# Patient Record
Sex: MALE | Race: ASIAN | NOT HISPANIC OR LATINO | Employment: FULL TIME
[De-identification: names, ages, dates, MRNs, and addresses within clinical notes are randomized per-mention and may not be internally consistent; named-entity substitution may affect disease eponyms.]

---

## 2020-02-08 ENCOUNTER — HEALTH MAINTENANCE LETTER (OUTPATIENT)
Age: 30
End: 2020-02-08

## 2020-07-30 ENCOUNTER — OFFICE VISIT - HEALTHEAST (OUTPATIENT)
Dept: INTERNAL MEDICINE | Facility: CLINIC | Age: 30
End: 2020-07-30

## 2020-07-30 ENCOUNTER — COMMUNICATION - HEALTHEAST (OUTPATIENT)
Dept: TELEHEALTH | Facility: CLINIC | Age: 30
End: 2020-07-30

## 2020-07-30 DIAGNOSIS — E66.09 CLASS 2 OBESITY DUE TO EXCESS CALORIES WITHOUT SERIOUS COMORBIDITY WITH BODY MASS INDEX (BMI) OF 36.0 TO 36.9 IN ADULT: ICD-10-CM

## 2020-07-30 DIAGNOSIS — Z83.3 FAMILY HISTORY OF DIABETES MELLITUS: ICD-10-CM

## 2020-07-30 DIAGNOSIS — Z00.00 ROUTINE GENERAL MEDICAL EXAMINATION AT A HEALTH CARE FACILITY: ICD-10-CM

## 2020-07-30 DIAGNOSIS — E66.812 CLASS 2 OBESITY DUE TO EXCESS CALORIES WITHOUT SERIOUS COMORBIDITY WITH BODY MASS INDEX (BMI) OF 36.0 TO 36.9 IN ADULT: ICD-10-CM

## 2020-07-30 DIAGNOSIS — Z23 NEED FOR TD VACCINE: ICD-10-CM

## 2020-07-30 DIAGNOSIS — Z80.0 FAMILY HISTORY OF COLON CANCER: ICD-10-CM

## 2020-07-30 DIAGNOSIS — L70.8 OTHER ACNE: ICD-10-CM

## 2020-07-30 DIAGNOSIS — R06.83 SNORING: ICD-10-CM

## 2020-07-30 LAB
ALBUMIN SERPL-MCNC: 4.6 G/DL (ref 3.5–5)
ALP SERPL-CCNC: 34 U/L (ref 45–120)
ALT SERPL W P-5'-P-CCNC: 37 U/L (ref 0–45)
ANION GAP SERPL CALCULATED.3IONS-SCNC: 11 MMOL/L (ref 5–18)
AST SERPL W P-5'-P-CCNC: 25 U/L (ref 0–40)
BASOPHILS # BLD AUTO: 0.1 THOU/UL (ref 0–0.2)
BASOPHILS NFR BLD AUTO: 1 % (ref 0–2)
BILIRUB SERPL-MCNC: 0.7 MG/DL (ref 0–1)
BUN SERPL-MCNC: 14 MG/DL (ref 8–22)
CALCIUM SERPL-MCNC: 9.7 MG/DL (ref 8.5–10.5)
CHLORIDE BLD-SCNC: 104 MMOL/L (ref 98–107)
CHOLEST SERPL-MCNC: 183 MG/DL
CO2 SERPL-SCNC: 24 MMOL/L (ref 22–31)
CREAT SERPL-MCNC: 0.93 MG/DL (ref 0.7–1.3)
EOSINOPHIL # BLD AUTO: 0.3 THOU/UL (ref 0–0.4)
EOSINOPHIL NFR BLD AUTO: 3 % (ref 0–6)
ERYTHROCYTE [DISTWIDTH] IN BLOOD BY AUTOMATED COUNT: 11.9 % (ref 11–14.5)
FASTING STATUS PATIENT QL REPORTED: YES
GFR SERPL CREATININE-BSD FRML MDRD: >60 ML/MIN/1.73M2
GLUCOSE BLD-MCNC: 77 MG/DL (ref 70–125)
HBA1C MFR BLD: 5.6 % (ref 3.5–6)
HCT VFR BLD AUTO: 51.2 % (ref 40–54)
HDLC SERPL-MCNC: 67 MG/DL
HGB BLD-MCNC: 17.2 G/DL (ref 14–18)
LDLC SERPL CALC-MCNC: 96 MG/DL
LYMPHOCYTES # BLD AUTO: 2.3 THOU/UL (ref 0.8–4.4)
LYMPHOCYTES NFR BLD AUTO: 30 % (ref 20–40)
MCH RBC QN AUTO: 29.7 PG (ref 27–34)
MCHC RBC AUTO-ENTMCNC: 33.5 G/DL (ref 32–36)
MCV RBC AUTO: 88 FL (ref 80–100)
MONOCYTES # BLD AUTO: 0.6 THOU/UL (ref 0–0.9)
MONOCYTES NFR BLD AUTO: 7 % (ref 2–10)
NEUTROPHILS # BLD AUTO: 4.6 THOU/UL (ref 2–7.7)
NEUTROPHILS NFR BLD AUTO: 59 % (ref 50–70)
PLATELET # BLD AUTO: 312 THOU/UL (ref 140–440)
PMV BLD AUTO: 7.7 FL (ref 7–10)
POTASSIUM BLD-SCNC: 4.6 MMOL/L (ref 3.5–5)
PROT SERPL-MCNC: 7.7 G/DL (ref 6–8)
RBC # BLD AUTO: 5.79 MILL/UL (ref 4.4–6.2)
SODIUM SERPL-SCNC: 139 MMOL/L (ref 136–145)
TRIGL SERPL-MCNC: 98 MG/DL
TSH SERPL DL<=0.005 MIU/L-ACNC: 1.4 UIU/ML (ref 0.3–5)
URATE SERPL-MCNC: 7.3 MG/DL (ref 3–8)
WBC: 7.7 THOU/UL (ref 4–11)

## 2020-07-30 ASSESSMENT — MIFFLIN-ST. JEOR: SCORE: 1901.77

## 2020-07-31 ENCOUNTER — COMMUNICATION - HEALTHEAST (OUTPATIENT)
Dept: INTERNAL MEDICINE | Facility: CLINIC | Age: 30
End: 2020-07-31

## 2020-08-14 VITALS — HEIGHT: 61 IN | BODY MASS INDEX: 40.59 KG/M2 | WEIGHT: 215 LBS

## 2020-08-14 RX ORDER — DOXYCYCLINE 100 MG/1
100 CAPSULE ORAL
COMMUNITY
Start: 2020-07-30 | End: 2021-09-23

## 2020-08-14 ASSESSMENT — MIFFLIN-ST. JEOR: SCORE: 1785.67

## 2020-08-14 NOTE — PATIENT INSTRUCTIONS
"                            It was nice to see you in clinic today Jhonny,    Here are a few things that we talked about with regard to sleep habits:  1.  is excessive caffeine, judicious reduction towards the later part of the day is key, but this can be tricky if you are recovering from shiftwork.  It may be more reasonable,  if possible to take a 15-minute nap then to drink an entire cup of coffee.  Another habits that may improve your quality of sleep is to stop checking your phone throughout the night, or any time piece.  That means if he did not get up to the bathroom that all clocks be covered.    MY TREATMENT INFORMATION FOR SLEEP APNEA-  Jhonny Valera    DOCTOR : KODY [facility]  SLEEP CENTER :  [facility]    MY CONTACT NUMBER: [phone]    Am I having a home sleep study?  Watch this video:    Disposable device sent out require phone/computer application-   https://www.[employer].com/watch?v=BCce_vbiwxE  Please verify your insurance coverage with your insurance carrier    Frequently asked questions:  1. What is Obstructive Sleep Apnea (PAU)? PAU is the most common type of sleep apnea. Apnea means, \"without breath.\"  Apnea is most often caused by narrowing or collapse of the upper airway as muscles relax during sleep.   Almost everyone has occasional apneas. Most people with sleep apnea have had brief interruptions at night frequently for many years.  The severity of sleep apnea is related to how frequent and severe the events are.   2. What are the consequences of PAU? Symptoms include: feeling sleepy during the day, snoring loudly, gasping or stopping of breathing, trouble sleeping, and occasionally morning headaches or heartburn at night.  Sleepiness can be serious and even increase the risk of falling asleep while driving. Other health consequences may include development of high blood pressure and other cardiovascular disease in persons who are susceptible. Untreated PAU  can contribute " to heart disease, stroke and diabetes.   3. What are the treatment options? In most situations, sleep apnea is a lifelong disease that must be managed with daily therapy. Medications are not effective for sleep apnea and surgery is generally not considered until other therapies have been tried. Your treatment is your choice . Continuous Positive Airway (CPAP) works right away and is the therapy that is effective in nearly everyone. An oral device to hold your jaw forward is usually the next most reliable option. Other options include postioning devices (to keep you off your back), weight loss, and surgery including a tongue pacing device. There is more detail about some of these options below.    Important tips for using CPAP and similar devices   Know your equipment:  CPAP is continuous positive airway pressure that prevents obstructive sleep apnea by keeping the throat from collapsing while you are sleeping. In most cases, the device is  smart  and can slowly self-adjusts if your throat collapses and keeps a record every day of how well you are treated-this information is available to you and your care team.  BPAP is bilevel positive airway pressure that keeps your throat open and also assists each breath with a pressure boost to maintain adequate breathing.  Special kinds of BPAP are used in patients who have inadequate breathing from lung or heart disease. In most cases, the device is  smart  and can slowly self-adjusts to assist breathing. Like CPAP, the device keeps a record of how well you are treated.  Your mask is your connection to the device. You get to choose what feels most comfortable and the staff will help to make sure if fits. Here: are some examples of the different masks that are available:       Key points to remember on your journey with sleep apnea:  1. Sleep study.  PAP devices often need to be adjusted during a sleep study to show that they are effective and adjusted right.  2. Good tips to  remember: Try wearing just the mask during a quiet time during the day so your body adapts to wearing it. A humidifier is recommended for comfort in most cases to prevent drying of your nose and throat. Allergy medication from your provider may help you if you are having nasal congestion.  3. Getting settled-in. It takes more than one night for most of us to get used to wearing a mask. Try wearing just the mask during a quiet time during the day so your body adapts to wearing it. A humidifier is recommended for comfort in most cases. Our team will work with you carefully on the first day and will be in contact within 4 days and again at 2 and 4 weeks for advice and remote device adjustments. Your therapy is evaluated by the device each day.   4. Use it every night. The more you are able to sleep naturally for 7-8 hours, the more likely you will have good sleep and to prevent health risks or symptoms from sleep apnea. Even if you use it 4 hours it helps. Occasionally all of us are unable to use a medical therapy, in sleep apnea, it is not dangerous to miss one night.   5. Communicate. Call our skilled team on the number provided on the first day if your visit for problems that make it difficult to wear the device. Over 2 out of 3 patients can learn to wear the device long-term with help from our team. Remember to call our team or your sleep providers if you are unable to wear the device as we may have other solutions for those who cannot adapt to mask CPAP therapy. It is recommended that you sleep your sleep provider within the first 3 months and yearly after that if you are not having problems.   6. Use it for your health. We encourage use of CPAP masks during daytime quiet periods to allow your face and brain to adapt to the sensation of CPAP so that it will be a more natural sensation to awaken to at night or during naps. This can be very useful during the first few weeks or months of adapting to CPAP though it  does not help medically to wear CPAP during wakefulness and  should not be used as a strategy just to meet guidelines.  7. Take care of your equipment. Make sure you clean your mask and tubing using directions every day and that your filter and mask are replaced as recommended or if they are not working.     BESIDES CPAP, WHAT OTHER THERAPIES ARE THERE?    Positioning Device  Positioning devices are generally used when sleep apnea is mild and only occurs on your back.This example shows a pillow that straps around the waist. It may be appropriate for those whose sleep study shows milder sleep apnea that occurs primarily when lying flat on one's back. Preliminary studies have shown benefit but effectiveness at home may need to be verified by a home sleep test. These devices are generally not covered by medical insurance.  Examples of devices that maintain sleeping on the back to prevent snoring and mild sleep apnea.    Belt type body positioner  Http://[employer] ([employer])/    Electronic reminder  Http://nightshifttherapy.com/  Http://www.DoesThatMakeSense.com.Sapheneia.au/      Oral Appliance  What is oral appliance therapy?  An oral appliance device fits on your teeth at night like a retainer used after having braces. The device is made by a specialized dentist and requires several visits over 1-2 months before a manufactured device is made to fit your teeth and is adjusted to prevent your sleep apnea. Once an oral device is working properly, snoring should be improved. A home sleep test may be recommended at that time if to determine whether the sleep apnea is adequately treated.       Some things to remember:  -Oral devices are often, but not always, covered by your medical insurance. Be sure to check with your insurance provider.   -If you are referred for oral therapy, you will be given a list of specialized dentists to consider or you may choose to visit the Web site of the American Academy of Dental Sleep Medicine  -Oral devices are less  likely to work if you have severe sleep apnea or are extremely overweight.     More detailed information  An oral appliance is a small acrylic device that fits over the upper and lower teeth  (similar to a retainer or a mouth guard). This device slightly moves jaw forward, which moves the base of the tongue forward, opens the airway, improves breathing for effective treat snoring and obstructive sleep apnea in perhaps 7 out of 10 people .  The best working devices are custom-made by a dental device  after a mold is made of the teeth 1, 2, 3.  When is an oral appliance indicated?  Oral appliance therapy is recommended as a first-line treatment for patients with primary snoring, mild sleep apnea, and for patients with moderate sleep apnea who prefer appliance therapy to use of CPAP4, 5. Severity of sleep apnea is determined by sleep testing and is based on the number of respiratory events per hour of sleep.   How successful is oral appliance therapy?  The success rate of oral appliance therapy in patients with mild sleep apnea is 75-80% while in patients with moderate sleep apnea it is 50-70%. The chance of success in patients with severe sleep apnea is 40-50%. The research also shows that oral appliances have a beneficial effect on the cardiovascular health of PAU patients at the same magnitude as CPAP therapy7.  Oral appliances should be a second-line treatment in cases of severe sleep apnea, but if not completely successful then a combination therapy utilizing CPAP plus oral appliance therapy may be effective. Oral appliances tend to be effective in a broad range of patients although studies show that the patients who have the highest success are females, younger patients, those with milder disease, and less severe obesity. 3, 6.   Finding a dentist that practices dental sleep medicine  Specific training is available through the American Academy of Dental Sleep Medicine for dentists interested in  working in the field of sleep. To find a dentist who is educated in the field of sleep and the use of oral appliances, near you, visit the Web site of the American Academy of Dental Sleep Medicine.    References  1. Brigida et al. Objectively measured vs self-reported compliance during oral appliance therapy for sleep-disordered breathing. Chest 2013; 144(5): 2057-8779.  2. Lorri et al. Objective measurement of compliance during oral appliance therapy for sleep-disordered breathing. Thorax 2013; 68(1): 91-96.  3. Debby et al. Mandibular advancement devices in 620 men and women with PAU and snoring: tolerability and predictors of treatment success. Chest 2004; 125: 7858-9197.  4. Frederic et al. Oral appliances for snoring and PAU: a review. Sleep 2006; 29: 244-262.  5. Victoriano et al. Oral appliance treatment for PAU: an update. J Clin Sleep Med 2014; 10(2): 215-227.  6. Martha et al. Predictors of OSAH treatment outcome. J Dent Res 2007; 86: 8875-5085.      Weight Loss:    Weight loss is a long-term strategy that may improve sleep apnea in some patients.    Weight management is a personal decision and the decision should be based on your interest and the potential benefits.  If you are interested in exploring weight loss strategies, the following discussion covers the impact on weight loss on sleep apnea and the approaches that may be successful.    Being overweight does not necessarily mean you will have health consequences.  Those who have BMI over 35 or over 27 with existing medical conditions carries greater risk.   Weight loss decreases severity of sleep apnea in most people with obesity. For those with mild obesity who have developed snoring with weight gain, even 15-30 pound weight loss can improve and occasionally eliminate sleep apnea.  Structured and life-long dietary and health habits are necessary to lose weight and keep healthier weight levels.     Though there may be significant  health benefits from weight loss, long-term weight loss is very difficult to achieve- studies show success with dietary management in less than 10% of people. In addition, substantial weight loss may require years of dietary control and may be difficult if patients have severe obesity. In these cases, surgical management may be considered.  Finally, older individuals who have tolerated obesity without health complications may be less likely to benefit from weight loss strategies.      [unfilled]    Surgery:    Surgery for obstructive sleep apnea is considered generally only when other therapies fail to work. Surgery may be discussed with you if you are having a difficult time tolerating CPAP and or when there is an abnormal structure that requires surgical correction.  Nose and throat surgeries often enlarge the airway to prevent collapse.  Most of these surgeries create pain for 1-2 weeks and up to half of the most common surgeries are not effective throughout life.  You should carefully discuss the benefits and drawbacks to surgery with your sleep provider and surgeon to determine if it is the best solution for you.   More information  Surgery for PAU is directed at areas that are responsible for narrowing or complete obstruction of the airway during sleep.  There are a wide range of procedures available to enlarge and/or stabilize the airway to prevent blockage of breathing in the three major areas where it can occur: the palate, tongue, and nasal regions.  Successful surgical treatment depends on the accurate identification of the factors responsible for obstructive sleep apnea in each person.  A personalized approach is required because there is no single treatment that works well for everyone.  Because of anatomic variation, consultation with an examination by a sleep surgeon is a critical first step in determining what surgical options are best for each patient.  In some cases, examination during sedation may  be recommended in order to guide the selection of procedures.  Patients will be counseled about risks and benefits as well as the typical recovery course after surgery. Surgery is typically not a cure for a person s PAU.  However, surgery will often significantly improve one s PAU severity (termed  success rate ).  Even in the absence of a cure, surgery will decrease the cardiovascular risk associated with OSA7; improve overall quality of life8 (sleepiness, functionality, sleep quality, etc).      Palate Procedures:  Patients with PAU often have narrowing of their airway in the region of their tonsils and uvula.  The goals of palate procedures are to widen the airway in this region as well as to help the tissues resist collapse.  Modern palate procedure techniques focus on tissue conservation and soft tissue rearrangement, rather than tissue removal.  Often the uvula is preserved in this procedure. Residual sleep apnea is common in patient after pharyngoplasty with an average reduction in sleep apnea events of 33%2.      Tongue Procedures:  ExamWhile patients are awake, the muscles that surround the throat are active and keep this region open for breathing. These muscles relax during sleep, allowing the tongue and other structures to collapse and block breathing.  There are several different tongue procedures available.  Selection of a tongue base procedure depends on characteristics seen on physical exam.  Generally, procedures are aimed at removing bulky tissues in this area or preventing the back of the tongue from falling back during sleep.  Success rates for tongue surgery range from 50-62%3.    Hypoglossal Nerve Stimulation:  Hypoglossal nerve stimulation has recently received approval from the [place] Food and Drug Administration for the treatment of obstructive sleep apnea.  This is based on research showing that the system was safe and effective in treating sleep apnea6.  Results showed that the  median AHI score decreased 68%, from 29.3 to 9.0. This therapy uses an implant system that senses breathing patterns and delivers mild stimulation to airway muscles, which keeps the airway open during sleep.  The system consists of three fully implanted components: a small generator (similar in size to a pacemaker), a breathing sensor, and a stimulation lead.  Using a small handheld remote, a patient turns the therapy on before bed and off upon awakening.    Candidates for this device must be greater than 22 years of age, have moderate to severe PAU (AHI between 20-65), BMI less than 32, have tried CPAP/oral appliance without success, and have appropriate upper airway anatomy (determined by a sleep endoscopy performed by Dr. Coronado).    Hypoglossal Nerve Stimulation Pathway:    The sleep surgeon s office will work with the patient through the insurance prior-authorization process (including communications and appeals).    Nasal Procedures:  Nasal obstruction can interfere with nasal breathing during the day and night.  Studies have shown that relief of nasal obstruction can improve the ability of some patients to tolerate positive airway pressure therapy for obstructive sleep apnea1.  Treatment options include medications such as nasal saline, topical corticosteroid and antihistamine sprays, and oral medications such as antihistamines or decongestants. Non-surgical treatments can include external nasal dilators for selected patients. If these are not successful by themselves, surgery can improve the nasal airway either alone or in combination with these other options.      Combination Procedures:  Combination of surgical procedures and other treatments may be recommended, particularly if patients have more than one area of narrowing or persistent positional disease.  The success rate of combination surgery ranges from 66-80%2,3.    References  1. Pedrito ALEXANDER. The Role of the Nose in Snoring and Obstructive Sleep  Apnoea: An Update.  Eur Arch Otorhinolaryngol. 2011; 268: 1365-73.  2.  Gilberto SM; Charissa JA; Charity JR; Pallanch JF; Fransisco MB; William SG; Rafal NICHOLE. Surgical modifications of the upper airway for obstructive sleep apnea in adults: a systematic review and meta-analysis. SLEEP 2010;33(10):5673-3322. Sia MORGAN. Hypopharyngeal surgery in obstructive sleep apnea: an evidence-based medicine review.  Arch Otolaryngol Head Neck Surg. 2006 Feb;132(2):206-13.  3. Jason YH1, Lc Y, Jose Eduardo MIGUEL ANGEL. The efficacy of anatomically based multilevel surgery for obstructive sleep apnea. Otolaryngol Head Neck Surg. 2003 Oct;129(4):327-35.  4. Kezirian E, Goldberg A. Hypopharyngeal Surgery in Obstructive Sleep Apnea: An Evidence-Based Medicine Review. Arch Otolaryngol Head Neck Surg. 2006 Feb;132(2):206-13.  5. Vignesh CHOPRA et al. Upper-Airway Stimulation for Obstructive Sleep Apnea.  N Engl J Med. 2014 Jan 9;370(2):139-49.  6. Karen Y et al. Increased Incidence of Cardiovascular Disease in Middle-aged Men with Obstructive Sleep Apnea. Am J Respir Crit Care Med; 2002 166: 159-165  7. Christian EM et al. Studying Life Effects and Effectiveness of Palatopharyngoplasty (SLEEP) study: Subjective Outcomes of Isolated Uvulopalatopharyngoplasty. Otolaryngol Head Neck Surg. 2011; 144: 623-631.            Your BMI is Body mass index is 41.3 kg/m .  This is wrong Jhonny is 35.2 BMI  Weight management is a personal decision.  If you are interested in exploring weight loss strategies, the following discussion covers the approaches that may be successful. Body mass index (BMI) is one way to tell whether you are at a healthy weight, overweight, or obese. It measures your weight in relation to your height.  A BMI of 18.5 to 24.9 is in the healthy range. A person with a BMI of 25 to 29.9 considered overweight, and someone with a BMI of 30 or greater is considered obese. More than two-thirds of American adults are considered overweight or obese.  Being  overweight or obese increases the risk for further weight gain. Excess weight may lead to heart disease and diabetes.  Creating and following plans for healthy eating and physical activity may help you improve your health.  Weight control is part of healthy lifestyle and includes exercise, emotional health, and healthy eating habits. Careful eating habits lifelong are the mainstay of weight control. Though there are significant health benefits from weight loss, long-term weight loss with diet alone may be very difficult to achieve- studies show long-term success with dietary management in less than 10% of people. Attaining a healthy weight may be especially difficult to achieve in those with severe obesity. In some cases, medications, devices and surgical management might be considered.  What can you do?  If you are overweight or obese and are interested in methods for weight loss, you should discuss this with your provider.     Consider reducing daily calorie intake by 500 calories.     Keep a food journal.     Avoiding skipping meals, consider cutting portions instead.    Diet combined with exercise helps maintain muscle while optimizing fat loss. Strength training is particularly important for building and maintaining muscle mass. Exercise helps reduce stress, increase energy, and improves fitness. Increasing exercise without diet control, however, may not burn enough calories to loose weight.       Start walking three days a week 10-20 minutes at a time    Work towards walking thirty minutes five days a week     Eventually, increase the speed of your walking for 1-2 minutes at time    In addition, we recommend that you review healthy lifestyles and methods for weight loss available through the National Institutes of Health patient information sites:  http://win.niddk.nih.gov/publications/index.htm    And look into health and wellness programs that may be available through your health insurance provider,  employer, local community center, or noel club.    Weight management plan: Patient was referred to their PCP to discuss a diet and exercise plan.

## 2020-08-14 NOTE — PROGRESS NOTES
"Jhonny Valera is a 29 year old male who is being evaluated via a billable video visit.      The patient has been notified of following:     \"This video visit will be conducted via a call between you and your physician/provider. We have found that certain health care needs can be provided without the need for an in-person physical exam.  This service lets us provide the care you need with a video conversation.  If a prescription is necessary we can send it directly to your pharmacy.  If lab work is needed we can place an order for that and you can then stop by our lab to have the test done at a later time.    Video visits are billed at different rates depending on your insurance coverage.  Please reach out to your insurance provider with any questions.    If during the course of the call the physician/provider feels a video visit is not appropriate, you will not be charged for this service.\"    Patient has given verbal consent for Video visit? Yes  How would you like to obtain your AVS? MyChart  If you are dropped from the video visit, the video invite should be resent to: Send to e-mail at: [email or web address]  Will anyone else be joining your video visit? No      Video-Visit Details    Type of service:  Video Visit    Video Start Time: 2:09 PM  Video End Time: 2:58 pm    Originating Location (pt. Location): Home    Distant Location (provider location):  [facility]     Platform used for Video Visit: KDOY Tinajero CNP    Referral #  Creation Date  Referral Status  Status Update     [id number]  08/03/2020  Authorized  08/03/2020: Status History    Status Reason  Referral Type  Referral Reasons  Referral Class    No Approval Necessary - Patient Tracking  none  none  none    To Specialty  To Provider  To Location/Place of Service  To Department    Nurse Practitioner  Helen Kwok APRN CNP  [facility] " CENTER    To Vendor  Referred By  By Location/Place of Service  By Department    none  none  none  none    Priority  Start Date  Expiration Date  Referral Entered By    Routine  08/17/2020 08/17/2021  Barthel, Elizabeth J.    Visits Requested  Visits Authorized  Visits Completed  Visits Scheduled    1  1   1    Procedure Information     Free Text Procedure Description     NEW REFERRED INSIDE [facility]             Diagnosis Information     Diagnosis Description    HE referal from Oskar Damon for consult for snoring, no prev ss or machines please send pkt, may call [phone] for appt updates and may leave message    Referral Notes   Number of Notes: 1   Type  Date  User  Summary  Attachment    FSC  08/03/2020  1:13 PM  Barthel, Elizabeth J.  -  -    Note     PB DOS:                                 8/17/2020  Payor:                                     [employer] [place] [facility] [place]  Visit Description:                  SLEEP EVALUATION & MANAGEMENT REFERRAL - ADULT     Benefits Contact:                  SERENA-Verified  Benefits Phone:                      N/A  Reference Number:                N/A     Assigned Clinic:                      No assigned PCC  Referral Number:                    N/A  Referral Date Range:              N/A  Number of Visits:                    N/A     Procedures  Actigraphy:                              Non-Covered     Oximetry:                                 Non-Covered     HST/PSG:                               Auth Required  MSLT/MWT:                            Auth Required   WatchPat:                               Auth Required                   Communications   (0)        None      Sleep Center   Outpatient Sleep Medicine Consultation  8/17/2020        Name: Jhonny Valera MRN# [id number]   Age:  29 YOB: 1990      Date of Consultation: 08/17/2020   Consultation is requested by: Oskar Whitt MD - 07/30/2020 11:00 AM CDT    29-year-old man, 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  Obesity with body mass index of 36.7, with associated condition of snoring with concern for sleep apnea, but no history of metabolic disorders.          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  He is working hard to reduce his weight however he continues to have problems with daytime sleepiness at times and sleep quality could be better towards the end of the nights rest when it is fragmented with wake ups.  He has addressed shift rotation by use of melatonin which seems to work well when he switches from working PMs tonight.  He does use devices in bed briefly for mental distraction such as a podcast, however with wake ups throughout the night he may check the time which likely does and to some frustration about his inability to sleep throughout the night.  Caffeine intake is admittedly excessive by him.  Reduction was discussed  SCALES       SLEEP APNEA: Stopbang score: 6/8       INSOMNIA:  Insomnia severity score:  12/28       SLEEPINESS: [place] sleepiness scale: 8-10/24       RAÚL 7:      It is my impression that Jhonny, who is a 29-year-old male of  ethnicity, has a high pretest probability for obstructive sleep apnea with a stop bang score of 6/8, positive family history of sleep disordered breathing, and has a wide variety of symptoms and signs typical of obstructive sleep apnea, with classic impact on daytime alertness with feelings of tiredness and fatigue further exacerbated by shift rotation.  As his sleep  schedule is actually quite regular with exception towards the end of his nights rest he would likely be an excellent candidate for the watch pat 1.  He would like to have this completed while he is on a one-month schedule of dayshift which is next month.  Summary Sleep Diagnoses:      Snoring : With snort arousals, some feeling of breathing better at night if propped up on pillows, some feeling of panic when he awakens with an awareness of having not been breathing.  I think he would be an excellent candidate for the watch pat 1 with a regular bedtime, and minimal length of wake ups until the end of the nights rest.  I did describe the technology and he does not have raynaud's syndrome and is not on an alpha-blocker.  If he has significant obstructive sleep apnea he would like to be called at the time that his study is read so that he may be placed on CPAP therapy.  He was would also like a follow-up visit to discuss study results.    Shiftwork: Well handled in general with use of melatonin when changing from be a night to a day sleeper    Sleep habits: Somewhat excessive caffeine, should consider reducing caffeine towards the early part of the afternoon unless absolutely necessary for safety of patients or himself.  Checking the clock throughout the night likely adds to continued wake ups and more sleep fragmentation.    Obesity: BMI 36: He is working on weight loss      Summary Recommendations:      or HST with stop bang score of 6/8  Treatment therapies and final decision through my chart communication to following provider, he would consider starting CPAP if he should have significant obstructive sleep apnea.  He would also like a or with virtual visit 2 weeks after his study to discuss test resultsSummary Counseling:  See instructions     Counseling included a comprehensive review of pathophysiology of possible obstructive sleep apnea. Diagnostic and therapeutic strategies as well as risks of inadequate therapy  or no treatment were discussed.  Cessation of sleep habits:(with RED + are as follows._+_ device use 1 hr before bedtime (cell phones, tablets, etc...)   _+_ avoidance of more than 3 caffeinated beverages      Jhonny was provided with educational materials in instructions which were reviewed with this visit: Sleep disordered breathing, testing, treatment options,                  History of Present Illness:        Jhonny reports the following signs and symptoms indicated with RED  +):     (snoing_+__, snorting__, gasping_+___ waking up with panic or inc in HR_+___ sleeping separately____ sleep better if somewhat elevated or in chair_+___unable to lie flat and breathe in bed_-___) +breathe rites    Jhonny has noted the development of sleep disordered breathing symptoms, along with GERD, bruxism, sleep paralysis and occasional confusional arousals with sleep talking over the past 1-2 years as he had gained some weight and was under slept while in the residents program..      SLEEP COMPLAINTS: DENIES THE FOLLOWING: (unless indicated with RED  +):  Signs/symptoms:    Morning headaches or confusion- mild HA infreq, lost weight 10#, headache less frequent since weight loss of the last 2 weeks.  GERD or aspiration:+ better with wt loss  Pain -  Bruxism: + (invisiline)  Parasomnia:  sw  : confusional arousals+ with s talking  Narcolepsy:   Cataplexy:-  , sleep paralysis:+ (More frequent earlier in his medical education), hallucinations:-, -  RLS: -    Cardio-respiratory    Coexisting Lung disease:  -       Coexisting Heart disease:  -    Wkday:  9-9:30, up 5:-5:30A          weekend: 11PM bedtime, wake: Same or perhaps sleep in till   7 a.m.  SLEEP LATENCY 5-60 min(s)  and 2-4-5  awakening for bathroom x1 with 5 minutes to -60 (very rare unless he woke up before the and of his nights rest) mins to return to sleep  Naps try not to,  pwr nap: Very restorative  Sleep schedule is  regular,   Work schedule is  Overnight:  10-12 rare 2A  to up at 5 or 5:30A,  1/2 of the yr, every 5 days   Wards   Estimated total sleep time : 7hrs      Activities in bed:  podcast or google relaxation, will check phone with a wake up that is earlier than what is anticipated    Denies the following unless indicated with +:   Behaviors in bed:  clock watching: +   ,to do list: +  , worry about health or ability to complete next day's tasks:+    , Housework:   , employment:   , anxiety or rumination:    , waking up with heart pounding or racing:    .                       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 Sleep is not disrupted by environmental factors from noise,pets, children, bedpartner.       Sleep Family Hx: positive below if indicated with +        Snoring +        RLS-    PAU - +  Insomnia -   Parasomnia -   Chemical History: positive below if indicated with +     Tobacco: -     Uses caffeine work day 3-4 cup, off 1-2 p.m.    Supplements for wakefulness: -    EtOH: rare few beers every 2 wks or 1/2 Asian wine 1cup=2   Recreational Drugs: --  Sleep aids: transition from block nights to days  1Days between, will use melatonin         Review of Systems:     A complete 10 point review of systems was negative other than HPI or as commented below if in bold  Jhonny has lost 5-10 pounds in the last few weeks with intention.        CONSTITUTIONAL: NEGATIVE for weight gain/loss, fever, chills, sweats or night sweats, drug allergies.  EYES: NEGATIVE for changes in vision, blind spots, double vision.  ENT: NEGATIVE for ear pain, sore throat, sinus pain, post-nasal drip, runny nose, bloody nose  CARDIAC: NEGATIVE for fast heartbeats or fluttering in chest w/ wakeup chest pain or pressure, breathlessness when lying flat, swollen legs or swollen feet.  NEUROLOGIC: NEGATIVE headaches, weakness or numbness in the arms or legs.  DERMATOLOGIC: NEGATIVE for  "rashes, new moles or change in mole(s)  PULMONARY: NEGATIVE SOB at rest, SOB with activity, dry cough, productive cough, coughing up blood, wheezing or whistling when breathing.    GASTROINTESTINAL: NEGATIVE for nausea or vomitting, loose or watery stools, fat or grease in stools, constipation, abdominal pain, bowel movements black in color or blood noted.GERD  GENITOURINARY: NEGATIVE for pain during urination, blood in urine, urinating more frequently than usual, irregular menstrual periods.  MUSCULOSKELETAL: NEGATIVE for muscle pain, bone or joint pain, swollen joints.  ENDOCRINE: NEGATIVE for increased thirst or urination, diabetes.  MH: dep/anx         Physical Examination:     Oldhams Total Score 6/10/2020   Total score - Oldhams 12     Allergies:    Allergies   Allergen Reactions     Seasonal Allergies      Cockroach Rash       Medications:    Current Outpatient Medications   Medication Sig Dispense Refill     doxycycline monohydrate (MONODOX) 100 MG capsule Take 100 mg by mouth         Problem List:  Patient Active Problem List    Diagnosis Date Noted     Obesity 06/02/2016     Priority: Medium        Past Medical/Surgical History:  No past medical history on file.  Past Surgical History:   Procedure Laterality Date     GI SURGERY      fistula removed     SOFT TISSUE SURGERY      muscle removed from left calf d/t infectious spider bite           Physical Examination:  Vitals: Ht 1.537 m (5' 0.5\")   Wt 97.5 kg (215 lb)   BMI 41.30 kg/m    BMI= Body mass index is 41.3 kg/m .         Oldhams Total Score 8/14/2020   Total score - Oldhams 9       Neck Circumference: 16.5 inches/cm   Constitutional: . Awake, alert, cooperative, dressed casually, good eye contact, comfortably sitting in a chair, in no apparent distress  Mood: euthymic; affect congruent with full range and intensity.  Attention/Concentration:  Normal   Eyes: No icterus.  ENT: Mallampati Class: IV.   Tonsillar Stage: 1  hidden by pillars   crowded " oropharynx,  low-lying soft palate macroglossia with scalloped edges.  dentition: He is wearing invisible line   pulmonary:  Respiratory rate: Regular at rest  Extremities:  Negative for upper extremity clubbing of nails  Skin:  Facial: No rash or significant lesions.   Neurologic: Alert, oriented x3,       CC: Dr. Oskar Whitt

## 2020-08-17 ENCOUNTER — RECORDS - HEALTHEAST (OUTPATIENT)
Dept: ADMINISTRATIVE | Facility: OTHER | Age: 30
End: 2020-08-17

## 2020-08-17 ENCOUNTER — VIRTUAL VISIT (OUTPATIENT)
Dept: SLEEP MEDICINE | Facility: CLINIC | Age: 30
End: 2020-08-17
Payer: COMMERCIAL

## 2020-08-17 DIAGNOSIS — G47.8 UNHEALTHY SLEEP HABIT: ICD-10-CM

## 2020-08-17 DIAGNOSIS — G47.26 SHIFT WORK SLEEP DISORDER: ICD-10-CM

## 2020-08-17 DIAGNOSIS — R06.83 SNORING: Primary | ICD-10-CM

## 2020-08-17 PROCEDURE — 99204 OFFICE O/P NEW MOD 45 MIN: CPT | Mod: 95 | Performed by: NURSE PRACTITIONER

## 2020-08-17 NOTE — LETTER
"    8/17/2020        RE: Jhonny Valera  [address] [facility] [address]        Jhonny Valera is a 29 year old male who is being evaluated via a billable video visit.      The patient has been notified of following:     \"This video visit will be conducted via a call between you and your physician/provider. We have found that certain health care needs can be provided without the need for an in-person physical exam.  This service lets us provide the care you need with a video conversation.  If a prescription is necessary we can send it directly to your pharmacy.  If lab work is needed we can place an order for that and you can then stop by our lab to have the test done at a later time.    Video visits are billed at different rates depending on your insurance coverage.  Please reach out to your insurance provider with any questions.    If during the course of the call the physician/provider feels a video visit is not appropriate, you will not be charged for this service.\"    Patient has given verbal consent for Video visit? Yes  How would you like to obtain your AVS? MyChart  If you are dropped from the video visit, the video invite should be resent to: Send to e-mail at: [email or web address]  Will anyone else be joining your video visit? No      Video-Visit Details    Type of service:  Video Visit    Video Start Time: 2:09 PM  Video End Time: 2:58 pm    Originating Location (pt. Location): Home    Distant Location (provider location):  [facility]     Platform used for Video Visit: KODY Tinajero CNP    Referral #  Creation Date  Referral Status  Status Update     [id number]  08/03/2020  Authorized  08/03/2020: Status History    Status Reason  Referral Type  Referral Reasons  Referral Class    No Approval Necessary - Patient Tracking  none  none  none    To Specialty  To Provider  To Location/Place of Service  To Department    Nurse Practitioner  Helen Kwok " KODY Ingram CNP  [facility]    To Vendor  Referred By  By Location/Place of Service  By Department    none  none  none  none    Priority  Start Date  Expiration Date  Referral Entered By    Routine  08/17/2020 08/17/2021  Barthel, Elizabeth J.    Visits Requested  Visits Authorized  Visits Completed  Visits Scheduled    1  1   1    Procedure Information     Free Text Procedure Description     NEW REFERRED INSIDE [place]             Diagnosis Information     Diagnosis Description    HE referal from Oskar Damon for consult for snoring, no prev ss or machines please send pkt, may call [phone] for appt updates and may leave message    Referral Notes   Number of Notes: 1   Type  Date  User  Summary  Attachment    FSC  08/03/2020  1:13 PM  Barthel, Elizabeth J.  -  -    Note     PB DOS:                                 8/17/2020  Payor:                                     [facility] Clearance Note  Visit Description:                  SLEEP EVALUATION & MANAGEMENT REFERRAL - ADULT     Benefits Contact:                  SERENA-Verified  Benefits Phone:                      N/A  Reference Number:                N/A     Assigned Clinic:                      No assigned PCC  Referral Number:                    N/A  Referral Date Range:              N/A  Number of Visits:                    N/A     Procedures  Actigraphy:                              Non-Covered     Oximetry:                                 Non-Covered     HST/PSG:                               Auth Required  MSLT/MWT:                            Auth Required   WatchPat:                               Auth Required                   Communications   (0)        None      Sleep Center   Outpatient Sleep Medicine Consultation  8/17/2020        Name: Jhonny Valera MRN# [id number]   Age:  29 YOB: 1990      Date of Consultation: 08/17/2020   Consultation is requested by: Jose Eduardo  Oskar Quiroga MD - 07/30/2020 11:00 AM CDT    29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  Obesity with body mass index of 36.7, with associated condition of snoring with concern for sleep apnea, but no history of metabolic disorders.          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  He is working hard to reduce his weight however he continues to have problems with daytime sleepiness at times and sleep quality could be better towards the end of the nights rest when it is fragmented with wake ups.  He has addressed shift rotation by use of melatonin which seems to work well when he switches from working PMs tonight.  He does use devices in bed briefly for mental distraction such as a podcast, however with wake ups throughout the night he may check the time which likely does and to some frustration about his inability to sleep throughout the night.  Caffeine intake is admittedly excessive by him.  Reduction was discussed  SCALES       SLEEP APNEA: Stopbang score: 6/8       INSOMNIA:  Insomnia severity score:  12/28       SLEEPINESS: [place] sleepiness scale: 8-10/24       RAÚL 7:      It is my impression that Jhonny, who is a 29-year-old male of  ethnicity, has a high pretest probability for obstructive sleep apnea with a stop bang score of 6/8, positive family history of sleep disordered breathing, and has a wide variety of symptoms and signs typical of obstructive sleep apnea, with classic impact on daytime alertness with feelings of  tiredness and fatigue further exacerbated by shift rotation.  As his sleep schedule is actually quite regular with exception towards the end of his nights rest he would likely be an excellent candidate for the watch pat 1.  He would like to have this completed while he is on a one-month schedule of dayshift which is next month.  Summary Sleep Diagnoses:      Snoring : With snort arousals, some feeling of breathing better at night if propped up on pillows, some feeling of panic when he awakens with an awareness of having not been breathing.  I think he would be an excellent candidate for the watch pat 1 with a regular bedtime, and minimal length of wake ups until the end of the nights rest.  I did describe the technology and he does not have raynaud's syndrome and is not on an alpha-blocker.  If he has significant obstructive sleep apnea he would like to be called at the time that his study is read so that he may be placed on CPAP therapy.  He was would also like a follow-up visit to discuss study results.    Shiftwork: Well handled in general with use of melatonin when changing from be a night to a day sleeper    Sleep habits: Somewhat excessive caffeine, should consider reducing caffeine towards the early part of the afternoon unless absolutely necessary for safety of patients or himself.  Checking the clock throughout the night likely adds to continued wake ups and more sleep fragmentation.    Obesity: BMI 36: He is working on weight loss      Summary Recommendations:      or HST with stop bang score of 6/8  Treatment therapies and final decision through my chart communication to following provider, he would consider starting CPAP if he should have significant obstructive sleep apnea.  He would also like a or with virtual visit 2 weeks after his study to discuss test resultsSummary Counseling:  See instructions     Counseling included a comprehensive review of pathophysiology of possible obstructive sleep apnea.  Diagnostic and therapeutic strategies as well as risks of inadequate therapy or no treatment were discussed.  Cessation of sleep habits:(with RED + are as follows._+_ device use 1 hr before bedtime (cell phones, tablets, etc...)   _+_ avoidance of more than 3 caffeinated beverages      Jhonny was provided with educational materials in instructions which were reviewed with this visit: Sleep disordered breathing, testing, treatment options,                  History of Present Illness:        Jhonny reports the following signs and symptoms indicated with RED  +):     (snoing_+__, snorting__, gasping_+___ waking up with panic or inc in HR_+___ sleeping separately____ sleep better if somewhat elevated or in chair_+___unable to lie flat and breathe in bed_-___) +breathe rites    Jhonny has noted the development of sleep disordered breathing symptoms, along with GERD, bruxism, sleep paralysis and occasional confusional arousals with sleep talking over the past 1-2 years as he had gained some weight and was under slept while in the residents program..      SLEEP COMPLAINTS: DENIES THE FOLLOWING: (unless indicated with RED  +):  Signs/symptoms:    Morning headaches or confusion- mild HA infreq, lost weight 10#, headache less frequent since weight loss of the last 2 weeks.  GERD or aspiration:+ better with wt loss  Pain -  Bruxism: + (invisiline)  Parasomnia:  sw  : confusional arousals+ with s talking  Narcolepsy:   Cataplexy:-  , sleep paralysis:+ (More frequent earlier in his medical education), hallucinations:-, -  RLS: -    Cardio-respiratory    Coexisting Lung disease:  -       Coexisting Heart disease:  -    Wkday:  9-9:30, up 5:-5:30A          weekend: 11PM bedtime, wake: Same or perhaps sleep in till   7 a.m.  SLEEP LATENCY 5-60 min(s)  and 2-4-5  awakening for bathroom x1 with 5 minutes to -60 (very rare unless he woke up before the and of his nights rest) mins to return to sleep  Naps try not to,  pwr nap: Very  restorative  Sleep schedule is  regular,   Work schedule is  Overnight:  10-12 rare 2A to up at 5 or 5:30A,  1/2 of the yr, every 5 days   Wards   Estimated total sleep time : 7hrs      Activities in bed:  podcast or google Playmysong, will check phone with a wake up that is earlier than what is anticipated    Denies the following unless indicated with +:   Behaviors in bed:  clock watching: +   ,to do list: +  , worry about health or ability to complete next day's tasks:+    , Housework:   , employment:   , anxiety or rumination:    , waking up with heart pounding or racing:    .                       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 Sleep is not disrupted by environmental factors from noise,pets, children, bedpartner.       Sleep Family Hx: positive below if indicated with +        Snoring +        RLS-    PAU - +  Insomnia -   Parasomnia -   Chemical History: positive below if indicated with +     Tobacco: -     Uses caffeine work day 3-4 cup, off 1-2 p.m.    Supplements for wakefulness: -    EtOH: rare few beers every 2 wks or 1/2 Asian wine 1cup=2   Recreational Drugs: --  Sleep aids: transition from block nights to days  1Days between, will use melatonin         Review of Systems:     A complete 10 point review of systems was negative other than HPI or as commented below if in bold  Jhonny has lost 5-10 pounds in the last few weeks with intention.        CONSTITUTIONAL: NEGATIVE for weight gain/loss, fever, chills, sweats or night sweats, drug allergies.  EYES: NEGATIVE for changes in vision, blind spots, double vision.  ENT: NEGATIVE for ear pain, sore throat, sinus pain, post-nasal drip, runny nose, bloody nose  CARDIAC: NEGATIVE for fast heartbeats or fluttering in chest w/ wakeup chest pain or pressure, breathlessness when lying flat, swollen legs or swollen feet.  NEUROLOGIC: NEGATIVE  "headaches, weakness or numbness in the arms or legs.  DERMATOLOGIC: NEGATIVE for rashes, new moles or change in mole(s)  PULMONARY: NEGATIVE SOB at rest, SOB with activity, dry cough, productive cough, coughing up blood, wheezing or whistling when breathing.    GASTROINTESTINAL: NEGATIVE for nausea or vomitting, loose or watery stools, fat or grease in stools, constipation, abdominal pain, bowel movements black in color or blood noted.GERD  GENITOURINARY: NEGATIVE for pain during urination, blood in urine, urinating more frequently than usual, irregular menstrual periods.  MUSCULOSKELETAL: NEGATIVE for muscle pain, bone or joint pain, swollen joints.  ENDOCRINE: NEGATIVE for increased thirst or urination, diabetes.  MH: dep/anx         Physical Examination:     [place] Total Score 6/10/2020   Total score - [place] 12     Allergies:    Allergies   Allergen Reactions     Seasonal Allergies      Cockroach Rash       Medications:    Current Outpatient Medications   Medication Sig Dispense Refill     doxycycline monohydrate (MONODOX) 100 MG capsule Take 100 mg by mouth         Problem List:  Patient Active Problem List    Diagnosis Date Noted     Obesity 06/02/2016     Priority: Medium        Past Medical/Surgical History:  No past medical history on file.  Past Surgical History:   Procedure Laterality Date     GI SURGERY      fistula removed     SOFT TISSUE SURGERY      muscle removed from left calf d/t infectious spider bite           Physical Examination:  Vitals: Ht 1.537 m (5' 0.5\")   Wt 97.5 kg (215 lb)   BMI 41.30 kg/m    BMI= Body mass index is 41.3 kg/m .         [place] Total Score 8/14/2020   Total score - [place] 9       Neck Circumference: 16.5 inches/cm   Constitutional: . Awake, alert, cooperative, dressed casually, good eye contact, comfortably sitting in a chair, in no apparent distress  Mood: euthymic; affect congruent with full range and intensity.  Attention/Concentration:  Normal   Eyes: No " icterus.  ENT: Mallampati Class: IV.   Tonsillar Stage: 1  hidden by pillars   crowded oropharynx,  low-lying soft palate macroglossia with scalloped edges.  dentition: He is wearing invisible line   pulmonary:  Respiratory rate: Regular at rest  Extremities:  Negative for upper extremity clubbing of nails  Skin:  Facial: No rash or significant lesions.   Neurologic: Alert, oriented x3,       CC: Dr. Oskar Kwok, APRN, CNP-BC  Sleep Medicine

## 2020-08-24 ENCOUNTER — COMMUNICATION - HEALTHEAST (OUTPATIENT)
Dept: SLEEP MEDICINE | Facility: CLINIC | Age: 30
End: 2020-08-24

## 2020-09-05 ENCOUNTER — COMMUNICATION - HEALTHEAST (OUTPATIENT)
Dept: INTERNAL MEDICINE | Facility: CLINIC | Age: 30
End: 2020-09-05

## 2020-09-05 DIAGNOSIS — L70.8 OTHER ACNE: ICD-10-CM

## 2020-09-25 ENCOUNTER — OFFICE VISIT (OUTPATIENT)
Dept: SLEEP MEDICINE | Facility: CLINIC | Age: 30
End: 2020-09-25
Payer: COMMERCIAL

## 2020-09-25 DIAGNOSIS — G47.26 SHIFT WORK SLEEP DISORDER: ICD-10-CM

## 2020-09-25 DIAGNOSIS — R06.83 SNORING: ICD-10-CM

## 2020-09-25 DIAGNOSIS — G47.8 UNHEALTHY SLEEP HABIT: ICD-10-CM

## 2020-09-25 PROCEDURE — G0399 HOME SLEEP TEST/TYPE 3 PORTA: HCPCS | Performed by: INTERNAL MEDICINE

## 2020-09-30 NOTE — PROGRESS NOTES
WATCHPAT was scored using 1B 4% hypopnea rule.     PAHI: 25.9.     Pt will follow up with sleep provider to determine appropriate therapy.     Ordering Provider, Helen Kwok APRN CNP Charles O., RPSZHEN, RST

## 2020-09-30 NOTE — PROGRESS NOTES
Device has been registered and shipped via [employer] on 9/25/2020. Patient was notified that package was mailed out.     Svetlana Escoto MA on 9/30/2020 at 9:05 AM

## 2020-10-08 ENCOUNTER — MYC MEDICAL ADVICE (OUTPATIENT)
Dept: SLEEP MEDICINE | Facility: CLINIC | Age: 30
End: 2020-10-08

## 2020-10-08 DIAGNOSIS — G47.33 OSA (OBSTRUCTIVE SLEEP APNEA): Primary | ICD-10-CM

## 2020-10-08 NOTE — PROCEDURES
"WatchPAT - HOME SLEEP STUDY INTERPRETATION    Patient: Jhonny Valera  MRN: [id number]  YOB: 1990  Study Date: 2020  Referring Provider: Oskar Whitt MD  Ordering Provider: Helen Kwok NP    Chain of custody patient verification was not enabled.      Indications for Home Study: Jhonny Valera is a 29 year old male with a history of obesity who presents with symptoms suggestive of obstructive sleep apnea.    Estimated body mass index is 41.3 kg/m  as calculated from the following:    Height as of 20: 1.537 m (5' 0.5\").    Weight as of 20: 97.5 kg (215 lb).  Total score - [place]: 9 (2020 11:41 AM)  STOP-BAN/8    Data: A full night home sleep study was performed recording the standard physiologic parameters including peripheral arterial tonometry (PAT), sound/snoring, body position,  movement, sound, and oxygen saturation by pulse oximetry. Pulse rate was estimated by oximetry recording. Sleep staging (wake, REM, light, and deep sleep) was derived from PAT signal.  This study was considered adequate based on > 4 hours of quality oximetry and respiratory recording. As specified by the AASM Manual for the Scoring of Sleep and Associated events, version 2.3, Rule VIII.D 1B, 4% oxygen desaturation scoring for hypopneas is used as a standard of care on all home sleep apnea testing.    Total Recording Time: 7 hrs, 27 min  Total Sleep Time: 6 hrs, 32 min  % of Sleep Time REM: 31%    Respiratory:  Snoring: Snoring was present.  Respiratory events: The PAT respiratory disturbance index [pRDI] was 26.9 events per hour.  The PAT apnea/hypopnea index [pAHI] was 23.7 events per hour.  RILEY was 23.1 events per hour.  During REM sleep the pAHI was 45.4.  Sleep Associated Hypoxemia: sustained hypoxemia was present. Mean oxygen saturation was 95%.  Minimum was 78%.  Time with saturation less than 88% was 10.1 minutes.    Heart Rate: By pulse oximetry normal rate was noted.     Position: " Percent of time spent: supine - 52%, prone - 11%, on right - 14%, on left - 22%.  pAHI was 25.8 per hour supine, 33.9 per hour prone, 21.2 per hour on right side, and 15.1 per hour on left side.     Assessment:   Moderate obstructive sleep apnea.  Sleep associated hypoxemia was present.    Recommendations:  Consider auto-CPAP at 5-15 cmH2O or oral appliance therapy.  Suggest optimizing sleep hygiene and avoiding sleep deprivation.  Weight management.    Diagnosis Code(s): Obstructive Sleep Apnea G47.33, Hypoxemia G47.36    Cecelia Stokes MD, October 8, 2020   Diplomate, American Board of Internal Medicine, Sleep Medicine

## 2020-10-14 PROBLEM — G47.33 OSA (OBSTRUCTIVE SLEEP APNEA): Status: ACTIVE | Noted: 2020-09-26

## 2020-10-21 ENCOUNTER — TELEPHONE (OUTPATIENT)
Dept: SLEEP MEDICINE | Facility: CLINIC | Age: 30
End: 2020-10-21

## 2020-10-21 NOTE — TELEPHONE ENCOUNTER
I called patient today 10/21/2020 at 5:07 pm to let him know we got the order for pap machine and went over his insurance coverage with him. He chose to purchase machine. He also understands the return policy for purchase is 10 days. He would like to do curbside  since he cannot try on the mask and we won't be able to turn the machine on. He asked that I email him the two machine and nasal mask choices and he will call me back in a few days to let me know what his choices are.   Email sent today 10/21/2020 to [email or web address].

## 2020-11-06 ENCOUNTER — TELEPHONE (OUTPATIENT)
Dept: SLEEP MEDICINE | Facility: CLINIC | Age: 30
End: 2020-11-06

## 2020-11-06 NOTE — TELEPHONE ENCOUNTER
I called patient today 11/06/20 at 11:30 am to follow up with him if he would like to get the pap machine. Per patient he hasn't had time to look at the email I sent him but asked that I resend him a new email and he will look at it this Sunday.

## 2020-11-08 ENCOUNTER — HEALTH MAINTENANCE LETTER (OUTPATIENT)
Age: 30
End: 2020-11-08

## 2020-12-02 ENCOUNTER — DOCUMENTATION ONLY (OUTPATIENT)
Dept: OTHER | Facility: CLINIC | Age: 30
End: 2020-12-02

## 2020-12-02 DIAGNOSIS — G47.33 OBSTRUCTIVE SLEEP APNEA (ADULT) (PEDIATRIC): Primary | ICD-10-CM

## 2020-12-02 NOTE — PROGRESS NOTES
Patient was offered choice of vendor and chose [facility].  Patient Jhonny Valera was set up at [facility]  on December 2, 2020. Patient received a [employer] DreamStation Auto. Pressures were set at 5-15 cm H2O.   Patient s ramp is 4 cm H2O for 30 min and FLEX/EPR is A Flex.  Patient received a Resmed Mask name: Airfit P10  Pillow mask size fitpack, heated tubing and heated humidifier.  Patient does not need to meet compliance. Patient has a follow up not required with Dr. Stokes.    Mandy Villagran   [facility]      Physical Exam

## 2021-03-27 ENCOUNTER — HEALTH MAINTENANCE LETTER (OUTPATIENT)
Age: 31
End: 2021-03-27

## 2021-06-04 VITALS
SYSTOLIC BLOOD PRESSURE: 118 MMHG | HEIGHT: 66 IN | DIASTOLIC BLOOD PRESSURE: 86 MMHG | HEART RATE: 96 BPM | OXYGEN SATURATION: 96 % | WEIGHT: 224.2 LBS | TEMPERATURE: 98.4 F | BODY MASS INDEX: 36.03 KG/M2

## 2021-06-10 NOTE — PATIENT INSTRUCTIONS - HE
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  Issues are common acne, he is on maintenance doxycycline for the last several months, seems reasonable to continue.  Obesity with body mass index of 36.7, with associated condition of snoring with concern for sleep apnea, but no history of metabolic disorders.  Family history of colon cancer (mother at age 69).  Family history of type 2 diabetes (father).  Family history of gout in his identical twin brother.  Up-to-date on vaccinations, except that he might consider getting a tetanus booster.    He is fasting this morning, so we will check comprehensive metabolic panel, lipid profile, A1c, thyroid cascade, CBC, and uric acid level.    Going issue by issue:    Common acne, he is on maintenance doxycycline for the last several months, seems reasonable to continue.  He tried topical interventions like benzyl peroxide and Retin-A cream.  He might of been less than consistent with applications of those.  He has found doxycycline gives him good control of acne.  I saw no active pustules on physical exam today.  There is the theoretical concern of inducing antibiotic resistance.  He is not having any GI tolerance.  Seems reasonable to continue doxycycline, and so I renewed his prescription for the next year.    Obesity with body mass index of 36.7, with associated condition of snoring with concern for sleep apnea, but no history of metabolic disorders.  He has recently implemented an intermittent fasting lifestyle, and reports having lost about 5 pounds.  Longer-term, he needs to get down to probably 180 pounds or less.  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  Hard to find time to exercise.  He is focusing on avoiding snacks.  He might consider getting back into martial arts.    Snoring, some daytime fatigue, will send her for sleep consultation to look for possibility of  obstructive sleep apnea.     Family history of colon cancer (mother at age 69).  Given family history, he should start colon screening in his 40s    Family history of type 2 diabetes (father).  Will check fasting glucose today as part of his metabolic panel.  We will also check lipid profile and A1c.    Family history of gout in his identical twin brother.  Check uric acid level today.    Up-to-date on vaccinations, except that he might consider getting a tetanus booster.    Hyperopia left eye

## 2021-06-10 NOTE — PROGRESS NOTES
Office Visit - New Patient   Jhonny Valera   29 y.o.  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  Issues are common acne, he is on maintenance doxycycline for the last several months, seems reasonable to continue.  Obesity with body mass index of 36.7, with associated condition of snoring with concern for sleep apnea, but no history of metabolic disorders.  Snoring, some daytime fatigue, will send her for sleep consultation to look for possibility of obstructive sleep apnea.  Family history of colon cancer (mother at age 69).  Family history of type 2 diabetes (father).  Family history of gout in his identical twin brother.  Up-to-date on vaccinations, except that he might consider getting a tetanus booster.    He is fasting this morning, so we will check comprehensive metabolic panel, lipid profile, A1c, thyroid cascade, CBC, and uric acid level.    Going issue by issue:    Common acne, he is on maintenance doxycycline for the last several months, seems reasonable to continue.  He tried topical interventions like benzyl peroxide and Retin-A cream.  He might of been less than consistent with applications of those.  He has found doxycycline gives him good control of acne.  I saw no active pustules on physical exam today.  There is the theoretical concern of inducing antibiotic resistance.  He is not having any GI tolerance.  Seems reasonable to continue doxycycline, and so I renewed his prescription for the next year.    Obesity with body mass index of 36.7, with associated condition of snoring with concern for sleep apnea, but no history of metabolic disorders.  He has recently implemented an intermittent fasting lifestyle, and reports having lost about 5 pounds.  Longer-term, he needs to get down to probably 180 pounds or  less.  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  Hard to find time to exercise.  He is focusing on avoiding snacks.  He might consider getting back into martial arts.    Snoring, some daytime fatigue, will send her for sleep consultation to look for possibility of obstructive sleep apnea.     Family history of colon cancer (mother at age 69).  Given family history, he should start colon screening in his 40s    Family history of type 2 diabetes (father).  Will check fasting glucose today as part of his metabolic panel.  We will also check lipid profile and A1c.    Family history of gout in his identical twin brother.  Check uric acid level today.    Up-to-date on vaccinations, except that he might consider getting a tetanus booster.    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  Issues are common acne, he is on maintenance doxycycline for the last several months, seems reasonable to continue.  Obesity with body mass index of 36.7, with associated condition of snoring with concern for sleep apnea, but no history of metabolic disorders.  Snoring, some daytime fatigue, will send her for sleep consultation to look for possibility of obstructive sleep apnea.  Family history of colon cancer (mother at age 69).  Family history of type 2 diabetes (father).  Family history of gout in his identical twin brother.  Up-to-date on vaccinations, except that he might consider getting a tetanus booster.    Acne  Has  been on benzoyl peroxide  Retin-A cream  On Doxycycline lasyt several months  Go tolerance OK    Peak weight 225#  Last lost 5 pounds using intermittent fasting lifestyle  Biking  Weight  Wt Readings from Last 3 Encounters:   07/30/20 (!) 224 lb 3.2 oz (101.7 kg)     BP Readings from Last 3 Encounters:   07/30/20 118/86     Mom age 69 diagnosed colon cancer.  Mets to lungs  On Xeloda    Dad type 2 diabetes    Identical twin has gout    Immunization History   Administered Date(s) Administered     DTP 02/13/1989, 04/17/1989, 06/30/1989, 05/21/1990     Dtap 04/20/1994     Hep A, Adult IM (19yr & older) 01/25/2010, 06/09/2011     Hep B, historic 04/09/2001, 05/14/2001, 08/14/2001     Hepatitis A, Ped/Adol 2 Dose IM (18yr & under) 08/14/2001     HiB, historic,unspecified 05/21/1990     Influenza [id number], 12/24/2009     Influenza, inj, historic,unspecified 10/21/1997, 11/03/1998, 11/30/2009     Influenza,seasonal quad, PF, =/> 6months 09/28/2016, 09/17/2018, 09/30/2019     Influenza,seasonal, Inj IIV3 11/11/1999, 11/12/2002, 11/25/2003, 10/11/2017     MMR 02/05/1990, 04/09/2001     Meningococcal MCV4P 06/05/2007     OPV,Trivalent,Historic([id number] only) 02/13/1989, 04/17/1989, 03/21/1990, 04/20/1994     POLIO, Unspecified 05/21/1990     Td, Adult, Absorbed 04/14/2004     Tdap 01/25/2010     Typhoid, historic, Unspecified 01/25/2010     No results found for: WBC, HGB, HCT, PLT, CHOL, TRIG, HDL, LDLDIRECT, ALT, AST, NA, K, CL, CREATININE, BUN, CO2, TSH, PSA, INR, GLUF, HGBA1C, MICROALBUR      Review of Systems: A comprehensive review of systems was negative except as noted.  ---------------------------------------------------------------------------------------------------------------------------    Medications and Allergies   Current Outpatient Medications   Medication Sig Dispense Refill     doxycycline (MONODOX) 100 MG capsule Take 100 mg by mouth 2 (two) times a day.       No current facility-administered medications  "for this visit.      Allergies   Allergen Reactions     Hay Fever And Allergy Relief      Cockroach Rash        Active Ambulatory Problems     Diagnosis Date Noted     No Active Ambulatory Problems     Resolved Ambulatory Problems     Diagnosis Date Noted     No Resolved Ambulatory Problems     No Additional Past Medical History     No past surgical history on file.   No past medical history on file.     Family and Social History   Family History   Problem Relation Age of Onset     Colon cancer Mother      Diabetes Father      Hypertension Father      Hyperlipidemia Father      Gout Brother      Lupus Paternal Aunt      CABG Maternal Uncle      Schizophrenia Maternal Aunt      Schizophrenia Brother         Social History     Tobacco Use     Smoking status: Never Smoker     Smokeless tobacco: Never Used   Substance Use Topics     Alcohol use: Not on file     Drug use: Not on file        Physical Exam   General Appearance:   Very pleasant, is significantly overweight, normal mental status, breathing comfortably, moves easily around exam room.    /86 (Patient Site: Right Arm, Patient Position: Sitting, Cuff Size: Adult Large)   Pulse 96   Temp 98.4  F (36.9  C) (Oral)   Ht 5' 5.5\" (1.664 m)   Wt (!) 224 lb 3.2 oz (101.7 kg)   SpO2 96%   BMI 36.74 kg/m      General: Alert, in no distress  Skin: No significant lesion seen.  His acne is very well controlled, I do not see any active pustules  Eyes/nose/throat: Eyes without scleral icterus, eye movements normal, pupils equal and reactive, oropharynx clear, ears with normal TM's  MSK: Neck with good ROM  Lymphatic: Neck without adenopathy or masses  Endocrine: Thyroid with no nodules to palpation  Pulm: Lungs clear to auscultation bilaterally  Cardiac: Heart with regular rate and rhythm, no murmur or gallop  GI: Abdomen soft, nontender. No palpable enlargement of liver or spleen  MSK: Extremities no tenderness or edema  Neuro: Moves all extremities, without focal " weakness  Psych: Alert, normal mental status. Normal affect and speech         Additional Information        Oskar Whitt MD  Internal Medicine

## 2021-06-20 NOTE — LETTER
Letter by Ana Low LPN at      Author: Ana Low LPN Service: -- Author Type: --    Filed:  Encounter Date: 8/24/2020 Status: (Other)         Jhonny Valera  [address] [facility] [address]             August 24, 2020         Dear Angela Moraima,    We have received a referral for you to have a consult with one of our sleep providers at [facility].  We are currently not having patients come into the clinic but are happy to off you a video or telephone consult if you are interested in that option.  The consult consists of talking with the provider about your sleep concerns and than deciding what the best option of care will be from there.  If this is something you would like to get scheduled, please call us directly at [phone] and we will get you scheduled as soon as possible.        Thank you,          Electronically signed by Ana Low LPN

## 2021-09-11 ENCOUNTER — HEALTH MAINTENANCE LETTER (OUTPATIENT)
Age: 31
End: 2021-09-11

## 2021-09-23 DIAGNOSIS — L70.0 ACNE VULGARIS: Primary | ICD-10-CM

## 2021-09-24 RX ORDER — DOXYCYCLINE 100 MG/1
100 CAPSULE ORAL DAILY
Qty: 100 CAPSULE | Refills: 3 | Status: SHIPPED | OUTPATIENT
Start: 2021-09-24 | End: 2023-03-10

## 2022-08-31 ENCOUNTER — LAB REQUISITION (OUTPATIENT)
Dept: LAB | Facility: CLINIC | Age: 32
End: 2022-08-31

## 2022-08-31 PROCEDURE — 86787 VARICELLA-ZOSTER ANTIBODY: CPT | Performed by: INTERNAL MEDICINE

## 2022-08-31 PROCEDURE — 86481 TB AG RESPONSE T-CELL SUSP: CPT | Performed by: INTERNAL MEDICINE

## 2022-09-01 LAB
VZV IGG SER QL IA: 2746 INDEX
VZV IGG SER QL IA: POSITIVE

## 2022-09-02 LAB
QUANTIFERON MITOGEN: 10 IU/ML
QUANTIFERON NIL TUBE: 0.03 IU/ML
QUANTIFERON TB1 TUBE: 0.03 IU/ML
QUANTIFERON TB2 TUBE: 0.04

## 2022-09-03 LAB
GAMMA INTERFERON BACKGROUND BLD IA-ACNC: 0.03 IU/ML
M TB IFN-G BLD-IMP: NEGATIVE
M TB IFN-G CD4+ BCKGRND COR BLD-ACNC: 9.97 IU/ML
MITOGEN IGNF BCKGRD COR BLD-ACNC: 0 IU/ML
MITOGEN IGNF BCKGRD COR BLD-ACNC: 0.01 IU/ML

## 2022-10-29 ENCOUNTER — HEALTH MAINTENANCE LETTER (OUTPATIENT)
Age: 32
End: 2022-10-29

## 2023-03-09 ASSESSMENT — ENCOUNTER SYMPTOMS
PALPITATIONS: 0
CHILLS: 0
HEADACHES: 0
HEARTBURN: 0
MYALGIAS: 0
COUGH: 0
ABDOMINAL PAIN: 0
JOINT SWELLING: 0
WEAKNESS: 0
FREQUENCY: 0
SHORTNESS OF BREATH: 0
NERVOUS/ANXIOUS: 0
CONSTIPATION: 0
HEMATURIA: 0
NAUSEA: 0
FEVER: 0
ARTHRALGIAS: 0
SORE THROAT: 0
DIARRHEA: 0
PARESTHESIAS: 0
DIZZINESS: 0
EYE PAIN: 0
HEMATOCHEZIA: 0
DYSURIA: 0

## 2023-03-10 ENCOUNTER — OFFICE VISIT (OUTPATIENT)
Dept: INTERNAL MEDICINE | Facility: CLINIC | Age: 33
End: 2023-03-10
Payer: COMMERCIAL

## 2023-03-10 VITALS
SYSTOLIC BLOOD PRESSURE: 120 MMHG | TEMPERATURE: 98.4 F | BODY MASS INDEX: 35.2 KG/M2 | WEIGHT: 219 LBS | RESPIRATION RATE: 16 BRPM | OXYGEN SATURATION: 98 % | HEIGHT: 66 IN | HEART RATE: 82 BPM | DIASTOLIC BLOOD PRESSURE: 78 MMHG

## 2023-03-10 DIAGNOSIS — E66.812 CLASS 2 OBESITY DUE TO EXCESS CALORIES WITHOUT SERIOUS COMORBIDITY WITH BODY MASS INDEX (BMI) OF 35.0 TO 35.9 IN ADULT: ICD-10-CM

## 2023-03-10 DIAGNOSIS — E66.09 CLASS 2 OBESITY DUE TO EXCESS CALORIES WITHOUT SERIOUS COMORBIDITY WITH BODY MASS INDEX (BMI) OF 35.0 TO 35.9 IN ADULT: ICD-10-CM

## 2023-03-10 DIAGNOSIS — L70.0 ACNE VULGARIS: ICD-10-CM

## 2023-03-10 DIAGNOSIS — G47.33 OSA (OBSTRUCTIVE SLEEP APNEA): ICD-10-CM

## 2023-03-10 DIAGNOSIS — Z00.00 ROUTINE GENERAL MEDICAL EXAMINATION AT A HEALTH CARE FACILITY: Primary | ICD-10-CM

## 2023-03-10 PROBLEM — Z83.3 FAMILY HISTORY OF DIABETES MELLITUS: Status: ACTIVE | Noted: 2020-07-30

## 2023-03-10 PROBLEM — R06.83 SNORING: Status: ACTIVE | Noted: 2020-07-30

## 2023-03-10 PROBLEM — Z80.0 FAMILY HISTORY OF COLON CANCER: Status: ACTIVE | Noted: 2020-07-30

## 2023-03-10 LAB
ALBUMIN SERPL BCG-MCNC: 4.9 G/DL (ref 3.5–5.2)
ALP SERPL-CCNC: 36 U/L (ref 40–129)
ALT SERPL W P-5'-P-CCNC: 50 U/L (ref 10–50)
ANION GAP SERPL CALCULATED.3IONS-SCNC: 14 MMOL/L (ref 7–15)
AST SERPL W P-5'-P-CCNC: 38 U/L (ref 10–50)
BILIRUB SERPL-MCNC: 0.7 MG/DL
BUN SERPL-MCNC: 13.8 MG/DL (ref 6–20)
CALCIUM SERPL-MCNC: 9.7 MG/DL (ref 8.6–10)
CHLORIDE SERPL-SCNC: 99 MMOL/L (ref 98–107)
CHOLEST SERPL-MCNC: 196 MG/DL
CREAT SERPL-MCNC: 0.92 MG/DL (ref 0.67–1.17)
DEPRECATED HCO3 PLAS-SCNC: 25 MMOL/L (ref 22–29)
ERYTHROCYTE [DISTWIDTH] IN BLOOD BY AUTOMATED COUNT: 12 % (ref 10–15)
GFR SERPL CREATININE-BSD FRML MDRD: >90 ML/MIN/1.73M2
GLUCOSE SERPL-MCNC: 91 MG/DL (ref 70–99)
HBA1C MFR BLD: 5.8 % (ref 0–5.6)
HCT VFR BLD AUTO: 51.9 % (ref 40–53)
HDLC SERPL-MCNC: 71 MG/DL
HGB BLD-MCNC: 17.1 G/DL (ref 13.3–17.7)
LDLC SERPL CALC-MCNC: 103 MG/DL
MCH RBC QN AUTO: 28.7 PG (ref 26.5–33)
MCHC RBC AUTO-ENTMCNC: 32.9 G/DL (ref 31.5–36.5)
MCV RBC AUTO: 87 FL (ref 78–100)
NONHDLC SERPL-MCNC: 125 MG/DL
PLATELET # BLD AUTO: 300 10E3/UL (ref 150–450)
POTASSIUM SERPL-SCNC: 4.3 MMOL/L (ref 3.4–5.3)
PROT SERPL-MCNC: 8.2 G/DL (ref 6.4–8.3)
RBC # BLD AUTO: 5.96 10E6/UL (ref 4.4–5.9)
SODIUM SERPL-SCNC: 138 MMOL/L (ref 136–145)
TRIGL SERPL-MCNC: 110 MG/DL
TSH SERPL DL<=0.005 MIU/L-ACNC: 1.52 UIU/ML (ref 0.3–4.2)
URATE SERPL-MCNC: 6.3 MG/DL (ref 3.4–7)
WBC # BLD AUTO: 7.6 10E3/UL (ref 4–11)

## 2023-03-10 PROCEDURE — 84550 ASSAY OF BLOOD/URIC ACID: CPT | Performed by: INTERNAL MEDICINE

## 2023-03-10 PROCEDURE — 85027 COMPLETE CBC AUTOMATED: CPT | Performed by: INTERNAL MEDICINE

## 2023-03-10 PROCEDURE — 36415 COLL VENOUS BLD VENIPUNCTURE: CPT | Performed by: INTERNAL MEDICINE

## 2023-03-10 PROCEDURE — 80053 COMPREHEN METABOLIC PANEL: CPT | Performed by: INTERNAL MEDICINE

## 2023-03-10 PROCEDURE — 80061 LIPID PANEL: CPT | Performed by: INTERNAL MEDICINE

## 2023-03-10 PROCEDURE — 84443 ASSAY THYROID STIM HORMONE: CPT | Performed by: INTERNAL MEDICINE

## 2023-03-10 PROCEDURE — 99395 PREV VISIT EST AGE 18-39: CPT | Performed by: INTERNAL MEDICINE

## 2023-03-10 PROCEDURE — 83036 HEMOGLOBIN GLYCOSYLATED A1C: CPT | Performed by: INTERNAL MEDICINE

## 2023-03-10 RX ORDER — DOXYCYCLINE 100 MG/1
100 CAPSULE ORAL DAILY
Qty: 100 CAPSULE | Refills: 3 | Status: SHIPPED | OUTPATIENT
Start: 2023-03-10 | End: 2024-03-13

## 2023-03-10 ASSESSMENT — ENCOUNTER SYMPTOMS
FREQUENCY: 0
EYE PAIN: 0
NAUSEA: 0
CHILLS: 0
DIZZINESS: 0
HEADACHES: 0
CONSTIPATION: 0
ARTHRALGIAS: 0
MYALGIAS: 0
NERVOUS/ANXIOUS: 0
PALPITATIONS: 0
DIARRHEA: 0
FEVER: 0
ABDOMINAL PAIN: 0
WEAKNESS: 0
HEARTBURN: 0
SHORTNESS OF BREATH: 0
COUGH: 0
SORE THROAT: 0
JOINT SWELLING: 0
DYSURIA: 0
HEMATOCHEZIA: 0
PARESTHESIAS: 0
HEMATURIA: 0

## 2023-03-10 NOTE — PATIENT INSTRUCTIONS
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    I last saw him a little over 2 years ago.  Has been doing well.  Got .  Gained a couple pounds after getting , but is working that off.    He is fasting this morning so we will check his lipid panel, A1c to screen for diabetes, comprehensive metabolic panel, CBC, TSH for thyroid.  Does not need PSA screening at age 32.    He recovered from COVID-19 infection in mid 2022, and then get the bivalent booster.    I reviewed his prescription for doxycycline to keep on hand in case of folliculitis or acne outbreak.      Common acne, maintenance doxycycline  Past topical interventions like benzyl peroxide and Retin-A cream.       Obesity with body mass index of 36.7, with associated condition of sleep apnea, but no history of metabolic disorders.      He has implemented an intermittent fasting lifestyle  Longer-term, he needs to get down to probably 180 pounds or less.     Wt Readings from Last 5 Encounters:   03/10/23 99.3 kg (219 lb)   08/14/20 97.5 kg (215 lb)   07/30/20 101.7 kg (224 lb 3.2 oz)   06/02/16 97.1 kg (214 lb)   09/18/15 91.6 kg (202 lb)     7-  Hemoglobin A1C 3.5 - 6.0 % 5.6      Cholesterol <=199 mg/dL 183      Triglycerides <=149 mg/dL 98      Direct Measure HDL >=40 mg/dL 67      LDL Cholesterol Calculated <=129 mg/dL 96      Obstructive sleep apnea, and he started on CPAP in 2021, and gets good results     Family history of colon cancer (mother at age 69).  Given family history, he should start colon screening in his 40s     Family history of type 2 diabetes (father)     Family history of gout in his identical twin brother.     Recovered from COVID-19 infection in mid 2022, no apparent long-term effects  COVID-19 Vaccine Bivalent Booster 12+ (Pfizer) 09/19/2022     Up-to-date on vaccinations, except  that he might consider getting a tetanus booster     Hyperopia left eye    Immunization History   Administered Date(s) Administered    COVID-19 Vaccine 12+ (Pfizer) 12/22/2020, 01/12/2021, 10/02/2021    COVID-19 Vaccine Bivalent Booster 12+ (Pfizer) 09/19/2022    DTAP (<7y) 04/20/1994    Flu, Unspecified 10/21/1997, 11/03/1998, 11/30/2009    HepA-Adult 01/25/2010, 06/09/2011    HepA-ped 2 Dose 08/14/2001    HepB, Unspecified 04/09/2001, 05/14/2001, 08/14/2001    Hib, Unspecified 05/21/1990    Historical DTP/aP 02/13/1989, 04/17/1989, 06/30/1989, 05/21/1990    Influenza (H1N1) 12/24/2009    Influenza (IIV3) PF 11/11/1999, 11/12/2002, 11/25/2003, 10/11/2017    Influenza Vaccine >6 months (Alfuria,Fluzone) 09/28/2016, 09/17/2018, 09/30/2019, 10/12/2020, 09/28/2021, 09/28/2022    MMR 02/05/1990, 04/09/2001    Meningococcal ACWY (Menactra ) 06/05/2007    OPV, trivalent, live 02/13/1989, 04/17/1989, 03/21/1990, 04/20/1994    Polio, Unspecified  05/21/1990    TD (ADULT, 7+) 07/30/2020    Td (Adult), Adsorbed 04/14/2004    Tdap (Adacel,Boostrix) 01/25/2010    Typhoid, Unspecified Formulation 01/25/2010

## 2023-03-10 NOTE — PROGRESS NOTES
SUBJECTIVE:   CC: Jhonny is an 32 year old who presents for preventative health visit.   Patient has been advised of split billing requirements and indicates understanding: Yes  Healthy Habits:     Getting at least 3 servings of Calcium per day:  Yes    Bi-annual eye exam:  Yes    Dental care twice a year:  Yes    Sleep apnea or symptoms of sleep apnea:  Sleep apnea    Diet:  Regular (no restrictions)    Frequency of exercise:  2-3 days/week    Duration of exercise:  15-30 minutes    Taking medications regularly:  Yes    Medication side effects:  Not applicable    PHQ-2 Total Score: 0    Additional concerns today:  No    Today's PHQ-2 Score:   PHQ-2 ( 1999 Pfizer) 3/9/2023   Q1: Little interest or pleasure in doing things 0   Q2: Feeling down, depressed or hopeless 0   PHQ-2 Score 0   Q1: Little interest or pleasure in doing things Not at all   Q2: Feeling down, depressed or hopeless Not at all   PHQ-2 Score 0       Have you ever done Advance Care Planning? (For example, a Health Directive, POLST, or a discussion with a medical provider or your loved ones about your wishes): No, advance care planning information given to patient to review.  Advanced care planning was discussed at today's visit.    Social History     Tobacco Use     Smoking status: Never     Smokeless tobacco: Never   Substance Use Topics     Alcohol use: Yes     Comment: rare         Alcohol Use 3/9/2023   Prescreen: >3 drinks/day or >7 drinks/week? No   No flowsheet data found.    Last PSA: No results found for: PSA    Reviewed orders with patient. Reviewed health maintenance and updated orders accordingly -       Reviewed and updated as needed this visit by clinical staff   Tobacco  Allergies  Meds              Reviewed and updated as needed this visit by Provider                     Review of Systems   Constitutional: Negative for chills and fever.   HENT: Negative for congestion, ear pain, hearing loss and sore throat.    Eyes: Negative for  "pain and visual disturbance.   Respiratory: Negative for cough and shortness of breath.    Cardiovascular: Negative for chest pain, palpitations and peripheral edema.   Gastrointestinal: Negative for abdominal pain, constipation, diarrhea, heartburn, hematochezia and nausea.   Genitourinary: Negative for dysuria, frequency, genital sores, hematuria, impotence, penile discharge and urgency.   Musculoskeletal: Negative for arthralgias, joint swelling and myalgias.   Skin: Positive for rash.   Neurological: Negative for dizziness, weakness, headaches and paresthesias.   Psychiatric/Behavioral: Negative for mood changes. The patient is not nervous/anxious.          OBJECTIVE:   /78 (BP Location: Right arm, Patient Position: Sitting, Cuff Size: Adult Regular)   Pulse 82   Temp 98.4  F (36.9  C)   Resp 16   Ht 1.676 m (5' 6\")   Wt 99.3 kg (219 lb)   SpO2 98%   BMI 35.35 kg/m      Physical Exam    General: Alert, in no distress  Skin: No significant lesion seen.  Eyes/nose/throat: Eyes without scleral icterus, eye movements normal, pupils equal and reactive, oropharynx clear, ears with normal TM's  MSK: Neck with good ROM  Lymphatic: Neck without adenopathy or masses  Endocrine: Thyroid with no nodules to palpation  Pulm: Lungs clear to auscultation bilaterally  Cardiac: Heart with regular rate and rhythm, no murmur or gallop  GI: Abdomen obese, soft, nontender. No palpable enlargement of liver or spleen  MSK: Extremities no tenderness or edema  Neuro: Moves all extremities, without focal weakness  Psych: Alert, normal mental status. 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    I last saw him a little over 2 years ago.  Has been doing well.  Got .  Gained a couple pounds after getting , but is working that " off.    He is fasting this morning so we will check his lipid panel, A1c to screen for diabetes, comprehensive metabolic panel, CBC, TSH for thyroid.  Does not need PSA screening at age 32.    He recovered from COVID-19 infection in mid 2022, and then get the bivalent booster.    I reviewed his prescription for doxycycline to keep on hand in case of folliculitis or acne outbreak.      Common acne, maintenance doxycycline  Past topical interventions like benzyl peroxide and Retin-A cream.       Obesity with body mass index of 36.7, with associated condition of sleep apnea, but no history of metabolic disorders.      He has implemented an intermittent fasting lifestyle  Longer-term, he needs to get down to probably 180 pounds or less.     Wt Readings from Last 5 Encounters:   03/10/23 99.3 kg (219 lb)   08/14/20 97.5 kg (215 lb)   07/30/20 101.7 kg (224 lb 3.2 oz)   06/02/16 97.1 kg (214 lb)   09/18/15 91.6 kg (202 lb)     7-  Hemoglobin A1C 3.5 - 6.0 % 5.6      Cholesterol <=199 mg/dL 183      Triglycerides <=149 mg/dL 98      Direct Measure HDL >=40 mg/dL 67      LDL Cholesterol Calculated <=129 mg/dL 96      Obstructive sleep apnea, and he started on CPAP in 2021, and gets good results     Family history of colon cancer (mother at age 69).  Given family history, he should start colon screening in his 40s     Family history of type 2 diabetes (father)     Family history of gout in his identical twin brother.     Recovered from COVID-19 infection in mid 2022, no apparent long-term effects  COVID-19 Vaccine Bivalent Booster 12+ (Pfizer) 09/19/2022     Up-to-date on vaccinations, except that he might consider getting a tetanus booster     Hyperopia left eye    Immunization History   Administered Date(s) Administered     COVID-19 Vaccine 12+ (Pfizer) 12/22/2020, 01/12/2021, 10/02/2021     COVID-19 Vaccine Bivalent Booster 12+ (Pfizer) 09/19/2022     DTAP (<7y) 04/20/1994     Flu, Unspecified 10/21/1997,  "11/03/1998, 11/30/2009     HepA-Adult 01/25/2010, 06/09/2011     HepA-ped 2 Dose 08/14/2001     HepB, Unspecified 04/09/2001, 05/14/2001, 08/14/2001     Hib, Unspecified 05/21/1990     Historical DTP/aP 02/13/1989, 04/17/1989, 06/30/1989, 05/21/1990     Influenza (H1N1) 12/24/2009     Influenza (IIV3) PF 11/11/1999, 11/12/2002, 11/25/2003, 10/11/2017     Influenza Vaccine >6 months (Alfuria,Fluzone) 09/28/2016, 09/17/2018, 09/30/2019, 10/12/2020, 09/28/2021, 09/28/2022     MMR 02/05/1990, 04/09/2001     Meningococcal ACWY (Menactra ) 06/05/2007     OPV, trivalent, live 02/13/1989, 04/17/1989, 03/21/1990, 04/20/1994     Polio, Unspecified  05/21/1990     TD (ADULT, 7+) 07/30/2020     Td (Adult), Adsorbed 04/14/2004     Tdap (Adacel,Boostrix) 01/25/2010     Typhoid, Unspecified Formulation 01/25/2010         COUNSELING:   Reviewed preventive health counseling, as reflected in patient instructions       Healthy diet/nutrition      BMI:   Estimated body mass index is 35.35 kg/m  as calculated from the following:    Height as of this encounter: 1.676 m (5' 6\").    Weight as of this encounter: 99.3 kg (219 lb).   Weight management plan: Discussed healthy diet and exercise guidelines      He reports that he has never smoked. He has never used smokeless tobacco.        NATHALY DIAZ MD  [facility]  "

## 2024-03-11 SDOH — HEALTH STABILITY: PHYSICAL HEALTH: ON AVERAGE, HOW MANY DAYS PER WEEK DO YOU ENGAGE IN MODERATE TO STRENUOUS EXERCISE (LIKE A BRISK WALK)?: 2 DAYS

## 2024-03-11 SDOH — HEALTH STABILITY: PHYSICAL HEALTH: ON AVERAGE, HOW MANY MINUTES DO YOU ENGAGE IN EXERCISE AT THIS LEVEL?: 40 MIN

## 2024-03-11 ASSESSMENT — SOCIAL DETERMINANTS OF HEALTH (SDOH): HOW OFTEN DO YOU GET TOGETHER WITH FRIENDS OR RELATIVES?: TWICE A WEEK

## 2024-03-13 ENCOUNTER — ORDERS ONLY (AUTO-RELEASED) (OUTPATIENT)
Dept: INTERNAL MEDICINE | Facility: CLINIC | Age: 34
End: 2024-03-13

## 2024-03-13 ENCOUNTER — OFFICE VISIT (OUTPATIENT)
Dept: INTERNAL MEDICINE | Facility: CLINIC | Age: 34
End: 2024-03-13
Payer: COMMERCIAL

## 2024-03-13 VITALS
OXYGEN SATURATION: 98 % | SYSTOLIC BLOOD PRESSURE: 124 MMHG | RESPIRATION RATE: 16 BRPM | BODY MASS INDEX: 35.36 KG/M2 | HEIGHT: 66 IN | HEART RATE: 77 BPM | WEIGHT: 220 LBS | TEMPERATURE: 98.4 F | DIASTOLIC BLOOD PRESSURE: 74 MMHG

## 2024-03-13 DIAGNOSIS — G47.33 OSA (OBSTRUCTIVE SLEEP APNEA): ICD-10-CM

## 2024-03-13 DIAGNOSIS — Z80.0 FAMILY HISTORY OF COLON CANCER: ICD-10-CM

## 2024-03-13 DIAGNOSIS — Z00.00 ROUTINE GENERAL MEDICAL EXAMINATION AT A HEALTH CARE FACILITY: Primary | ICD-10-CM

## 2024-03-13 DIAGNOSIS — R73.03 PRE-DIABETES: ICD-10-CM

## 2024-03-13 DIAGNOSIS — E66.812 CLASS 2 OBESITY DUE TO EXCESS CALORIES WITHOUT SERIOUS COMORBIDITY WITH BODY MASS INDEX (BMI) OF 35.0 TO 35.9 IN ADULT: ICD-10-CM

## 2024-03-13 DIAGNOSIS — L70.0 ACNE VULGARIS: ICD-10-CM

## 2024-03-13 DIAGNOSIS — E66.09 CLASS 2 OBESITY DUE TO EXCESS CALORIES WITHOUT SERIOUS COMORBIDITY WITH BODY MASS INDEX (BMI) OF 35.0 TO 35.9 IN ADULT: ICD-10-CM

## 2024-03-13 LAB
ERYTHROCYTE [DISTWIDTH] IN BLOOD BY AUTOMATED COUNT: 12.1 % (ref 10–15)
HBA1C MFR BLD: 5.8 % (ref 0–5.6)
HCT VFR BLD AUTO: 51.4 % (ref 40–53)
HGB BLD-MCNC: 17 G/DL (ref 13.3–17.7)
MCH RBC QN AUTO: 29.2 PG (ref 26.5–33)
MCHC RBC AUTO-ENTMCNC: 33.1 G/DL (ref 31.5–36.5)
MCV RBC AUTO: 88 FL (ref 78–100)
PLATELET # BLD AUTO: 290 10E3/UL (ref 150–450)
RBC # BLD AUTO: 5.83 10E6/UL (ref 4.4–5.9)
WBC # BLD AUTO: 6.9 10E3/UL (ref 4–11)

## 2024-03-13 PROCEDURE — 99395 PREV VISIT EST AGE 18-39: CPT | Performed by: INTERNAL MEDICINE

## 2024-03-13 PROCEDURE — 80061 LIPID PANEL: CPT | Performed by: INTERNAL MEDICINE

## 2024-03-13 PROCEDURE — 80053 COMPREHEN METABOLIC PANEL: CPT | Performed by: INTERNAL MEDICINE

## 2024-03-13 PROCEDURE — 99213 OFFICE O/P EST LOW 20 MIN: CPT | Mod: 25 | Performed by: INTERNAL MEDICINE

## 2024-03-13 PROCEDURE — 85027 COMPLETE CBC AUTOMATED: CPT | Performed by: INTERNAL MEDICINE

## 2024-03-13 PROCEDURE — 84443 ASSAY THYROID STIM HORMONE: CPT | Performed by: INTERNAL MEDICINE

## 2024-03-13 PROCEDURE — 84550 ASSAY OF BLOOD/URIC ACID: CPT | Performed by: INTERNAL MEDICINE

## 2024-03-13 PROCEDURE — 36415 COLL VENOUS BLD VENIPUNCTURE: CPT | Performed by: INTERNAL MEDICINE

## 2024-03-13 PROCEDURE — 83036 HEMOGLOBIN GLYCOSYLATED A1C: CPT | Performed by: INTERNAL MEDICINE

## 2024-03-13 RX ORDER — DOXYCYCLINE 100 MG/1
100 CAPSULE ORAL DAILY
Qty: 90 CAPSULE | Refills: 3 | Status: SHIPPED | OUTPATIENT
Start: 2024-03-13

## 2024-03-13 NOTE — PATIENT INSTRUCTIONS
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     I last saw him a little over 2 years ago.  Has been doing well, got .  Gained a couple pounds after getting , but is working that off.     He is fasting this morning so we will check his lipid panel, A1c to screen for diabetes, comprehensive metabolic panel, CBC, TSH for thyroid.  Does not need PSA screening at age 32.    I renewed his prescription for doxycycline to keep on hand in case of folliculitis or acne outbreak.    Common acne, maintenance doxycycline  Past topical interventions like benzyl peroxide and Retin-A cream.       Obesity with body mass index of 36.7, with associated condition of sleep apnea, but no history of metabolic disorders  Considering that Jhonny had some prediabetes with A1c of 5.8 in 2023, we discussed trying low-dose metformin 500 milligrams twice a day to reduce the risk of future type 2 diabetes and also may be helping lose weight    He has implemented an intermittent fasting lifestyle  Longer-term, he needs to get down to probably 180 pounds or less.      Wt Readings from Last 5 Encounters:   03/13/24 99.8 kg (220 lb)   03/10/23 99.3 kg (219 lb)   08/14/20 97.5 kg (215 lb)   07/30/20 101.7 kg (224 lb 3.2 oz)   06/02/16 97.1 kg (214 lb)     3-  LDL Cholesterol Calculated  <=100 mg/dL 103 High      Direct Measure HDL  >=40 mg/dL 71     Triglycerides  <150 mg/dL 110     Prediabetes  3-  Hemoglobin A1C  0.0 - 5.6 % 5.8 High      Obstructive sleep apnea, and he started on CPAP in 2021, and gets good results    Family history of schizophrenia (Sara elder brother who was born in 1986), and also a maternal aunt  Jhonny and his wife are thinking about starting a family, and Jhonny is interested in possibly pursuing genetic counseling so that he and his wife can understand the potential  risk of transmission of a potential inherited predisposition to schizophrenia.  I told Jhonny that if he would like to start a formal avenue of investigation, the for step would be a consultation with medical genetics    I anticipate that such services would be considered medically necessary and thus covered by health insurance, because it guides a decision about family-planning    Family history of colon cancer (mother at age 69).  Given family history, he should start colon screening in his 40s  March 13, 2024, Suhas told me he would be interested in doing the Cologuard stool test, which is a combination of immunochemical testing for hemoglobin in the stool, and also test for tumor DNA    Family history of type 2 diabetes (father)     Family history of gout in his identical twin brother.      Recovered from COVID-19 infection in mid 2022, no apparent long-term effects    COVID-19 Vaccine Bivalent Booster 12+ (Pfizer) 09/19/2022      Up-to-date on vaccinations     Hyperopia left eye    Immunization History   Administered Date(s) Administered    COVID-19 Bivalent 12+ (Pfizer) 09/19/2022    COVID-19 MONOVALENT 12+ (Pfizer) 12/22/2020, 01/12/2021, 10/02/2021    DTAP (<7y) 04/20/1994    Flu, Unspecified 10/21/1997, 11/03/1998, 11/30/2009    HEPATITIS A (PEDS 12M-18Y) 08/14/2001    HepB, Unspecified 04/09/2001, 05/14/2001, 08/14/2001    Hepatitis A (ADULT 19+) 01/25/2010, 06/09/2011    Hib, Unspecified 05/21/1990    Historical DTP/aP 02/13/1989, 04/17/1989, 06/30/1989, 05/21/1990    Influenza (H1N1) 12/24/2009    Influenza (IIV3) PF 11/11/1999, 11/12/2002, 11/25/2003, 10/11/2017    Influenza Vaccine >6 months,quad, PF 09/28/2016, 09/17/2018, 09/30/2019, 10/12/2020, 09/28/2021, 09/28/2022, 09/25/2023    MMR 02/05/1990, 04/09/2001    Meningococcal ACWY (Menactra ) 06/05/2007    OPV, trivalent, live 02/13/1989, 04/17/1989, 03/21/1990, 04/20/1994    Polio, Unspecified  05/21/1990    TD,PF 7+ (Tenivac) 07/30/2020    TDAP  (Adacel,Boostrix) 01/25/2010    Td (Adult), Adsorbed 04/14/2004    Typhoid, Unspecified Formulation 01/25/2010

## 2024-03-13 NOTE — PROGRESS NOTES
Preventive Care Visit  [facility]  NATHALY DIAZ MD, Internal Medicine  Mar 13, 2024          Rachel Barry is a 33 year old, presenting for the following:  Physical (fasting)        3/13/2024    12:54 PM   Additional Questions   Roomed by Misti        [facility] Care Directive  Patient does not have a Health Care Directive or Living Will: Discussed advance care planning with patient; information given to patient to review.    HPI        3/11/2024   General Health   How would you rate your overall physical health? (!) FAIR   Feel stress (tense, anxious, or unable to sleep) Rather much   (!) STRESS CONCERN      3/11/2024   Nutrition   Three or more servings of calcium each day? Yes   Diet: Regular (no restrictions)   How many servings of fruit and vegetables per day? (!) 2-3   How many sweetened beverages each day? 0-1         3/11/2024   Exercise   Days per week of moderate/strenous exercise 2 days   Average minutes spent exercising at this level 40 min   (!) EXERCISE CONCERN      3/11/2024   Social Factors   Frequency of gathering with friends or relatives Twice a week   Worry food won't last until get money to buy more No   Food not last or not have enough money for food? No   Do you have housing?  Yes   Are you worried about losing your housing? No   Lack of transportation? No   Unable to get utilities (heat,electricity)? No         3/11/2024   Dental   Dentist two times every year? Yes            Today's PHQ-2 Score:       3/13/2024    12:47 PM   PHQ-2 ( 1999 [employer])   Q1: Little interest or pleasure in doing things 0   Q2: Feeling down, depressed or hopeless 0   PHQ-2 Score 0   Q1: Little interest or pleasure in doing things Not at all   Q2: Feeling down, depressed or hopeless Not at all   PHQ-2 Score 0           3/11/2024   Substance Use   Alcohol more than 3/day or more than 7/wk No   Do you use any other substances recreationally? No     Social History     Tobacco Use    Smoking  "status: Never     Passive exposure: Never    Smokeless tobacco: Never   Vaping Use    Vaping Use: Never used   Substance Use Topics    Alcohol use: Yes     Comment: rare    Drug use: No         3/11/2024   One time HIV Screening   Previous HIV test? Yes         3/11/2024   STI Screening   New sexual partner(s) since last STI/HIV test? No         3/11/2024   Contraception/Family Planning   Questions about contraception or family planning (!) YES          Review of Systems  Constitutional, HEENT, cardiovascular, pulmonary, gi and gu systems are negative, except as otherwise noted.       Objective    Exam  /74 (BP Location: Right arm, Patient Position: Sitting, Cuff Size: Adult Regular)   Pulse 77   Temp 98.4  F (36.9  C)   Resp 16   Ht 1.676 m (5' 6\")   Wt 99.8 kg (220 lb)   SpO2 98%   BMI 35.51 kg/m     Estimated body mass index is 35.51 kg/m  as calculated from the following:    Height as of this encounter: 1.676 m (5' 6\").    Weight as of this encounter: 99.8 kg (220 lb).    Physical Exam    General: Alert, in no distress  Skin: No significant lesion seen.  Eyes/nose/throat: Eyes without scleral icterus, eye movements normal, pupils equal and reactive, oropharynx clear, ears with normal TM's  MSK: Neck with good ROM  Lymphatic: Neck without adenopathy or masses  Endocrine: Thyroid with no nodules to palpation  Pulm: Lungs clear to auscultation bilaterally  Cardiac: Heart with regular rate and rhythm, no murmur or gallop  GI: Abdomen obese, soft, nontender. No palpable enlargement of liver or spleen  MSK: Extremities no tenderness or edema  Neuro: Moves all extremities, without focal weakness  Psych: Alert, normal mental status. 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     I last saw him a little over 2 years " ago.  Has been doing well, got .  Gained a couple pounds after getting , but is working that off.     He is fasting this morning so we will check his lipid panel, A1c to screen for diabetes, comprehensive metabolic panel, CBC, TSH for thyroid.  Does not need PSA screening at age 32.    I renewed his prescription for doxycycline to keep on hand in case of folliculitis or acne outbreak.    Common acne, maintenance doxycycline  Past topical interventions like benzyl peroxide and Retin-A cream.       Obesity with body mass index of 36.7, with associated condition of sleep apnea, but no history of metabolic disorders  Considering that Jhonny had some prediabetes with A1c of 5.8 in 2023, we discussed trying low-dose metformin 500 milligrams twice a day to reduce the risk of future type 2 diabetes and also may be helping lose weight    He has implemented an intermittent fasting lifestyle  Longer-term, he needs to get down to probably 180 pounds or less.      Wt Readings from Last 5 Encounters:   03/13/24 99.8 kg (220 lb)   03/10/23 99.3 kg (219 lb)   08/14/20 97.5 kg (215 lb)   07/30/20 101.7 kg (224 lb 3.2 oz)   06/02/16 97.1 kg (214 lb)     3-  LDL Cholesterol Calculated  <=100 mg/dL 103 High      Direct Measure HDL  >=40 mg/dL 71     Triglycerides  <150 mg/dL 110     Prediabetes  3-  Hemoglobin A1C  0.0 - 5.6 % 5.8 High      Obstructive sleep apnea, and he started on CPAP in 2021, and gets good results    Family history of schizophrenia (Sara elder brother who was born in 1986), and also a maternal aunt  Jhonny and his wife are thinking about starting a family, and Jhonny is interested in possibly pursuing genetic counseling so that he and his wife can understand the potential risk of transmission of a potential inherited predisposition to schizophrenia.  I told Jhonny that if he would like to start a formal avenue of investigation, the for step would be a consultation with medical genetics    I  anticipate that such services would be considered medically necessary and thus covered by health insurance, because it guides a decision about family-planning    Family history of colon cancer (mother at age 69).  Given family history, he should start colon screening in his 40s  March 13, 2024, Suhas told me he would be interested in doing the Cologuard stool test, which is a combination of immunochemical testing for hemoglobin in the stool, and also test for tumor DNA    Family history of type 2 diabetes (father)     Family history of gout in his identical twin brother.      Recovered from COVID-19 infection in mid 2022, no apparent long-term effects    COVID-19 Vaccine Bivalent Booster 12+ (Pfizer) 09/19/2022      Up-to-date on vaccinations     Hyperopia left eye    Immunization History   Administered Date(s) Administered    COVID-19 Bivalent 12+ (Pfizer) 09/19/2022    COVID-19 MONOVALENT 12+ (Pfizer) 12/22/2020, 01/12/2021, 10/02/2021    DTAP (<7y) 04/20/1994    Flu, Unspecified 10/21/1997, 11/03/1998, 11/30/2009    HEPATITIS A (PEDS 12M-18Y) 08/14/2001    HepB, Unspecified 04/09/2001, 05/14/2001, 08/14/2001    Hepatitis A (ADULT 19+) 01/25/2010, 06/09/2011    Hib, Unspecified 05/21/1990    Historical DTP/aP 02/13/1989, 04/17/1989, 06/30/1989, 05/21/1990    Influenza (H1N1) 12/24/2009    Influenza (IIV3) PF 11/11/1999, 11/12/2002, 11/25/2003, 10/11/2017    Influenza Vaccine >6 months,quad, PF 09/28/2016, 09/17/2018, 09/30/2019, 10/12/2020, 09/28/2021, 09/28/2022, 09/25/2023    MMR 02/05/1990, 04/09/2001    Meningococcal ACWY (Menactra ) 06/05/2007    OPV, trivalent, live 02/13/1989, 04/17/1989, 03/21/1990, 04/20/1994    Polio, Unspecified  05/21/1990    TD,PF 7+ (Tenivac) 07/30/2020    TDAP (Adacel,Boostrix) 01/25/2010    Td (Adult), Adsorbed 04/14/2004    Typhoid, Unspecified Formulation 01/25/2010       Signed Electronically by: NATHALY DIAZ MD

## 2024-03-14 LAB
ALBUMIN SERPL BCG-MCNC: 5 G/DL (ref 3.5–5.2)
ALP SERPL-CCNC: 35 U/L (ref 40–150)
ALT SERPL W P-5'-P-CCNC: 39 U/L (ref 0–70)
ANION GAP SERPL CALCULATED.3IONS-SCNC: 12 MMOL/L (ref 7–15)
AST SERPL W P-5'-P-CCNC: 25 U/L (ref 0–45)
BILIRUB SERPL-MCNC: 0.7 MG/DL
BUN SERPL-MCNC: 11.2 MG/DL (ref 6–20)
CALCIUM SERPL-MCNC: 9.8 MG/DL (ref 8.6–10)
CHLORIDE SERPL-SCNC: 102 MMOL/L (ref 98–107)
CHOLEST SERPL-MCNC: 202 MG/DL
CREAT SERPL-MCNC: 0.89 MG/DL (ref 0.67–1.17)
DEPRECATED HCO3 PLAS-SCNC: 25 MMOL/L (ref 22–29)
EGFRCR SERPLBLD CKD-EPI 2021: >90 ML/MIN/1.73M2
FASTING STATUS PATIENT QL REPORTED: YES
GLUCOSE SERPL-MCNC: 88 MG/DL (ref 70–99)
HDLC SERPL-MCNC: 70 MG/DL
LDLC SERPL CALC-MCNC: 116 MG/DL
NONHDLC SERPL-MCNC: 132 MG/DL
POTASSIUM SERPL-SCNC: 4.4 MMOL/L (ref 3.4–5.3)
PROT SERPL-MCNC: 8 G/DL (ref 6.4–8.3)
SODIUM SERPL-SCNC: 139 MMOL/L (ref 135–145)
TRIGL SERPL-MCNC: 78 MG/DL
TSH SERPL DL<=0.005 MIU/L-ACNC: 1.33 UIU/ML (ref 0.3–4.2)
URATE SERPL-MCNC: 6.5 MG/DL (ref 3.4–7)

## 2024-04-09 LAB — NONINV COLON CA DNA+OCC BLD SCRN STL QL: NEGATIVE

## 2024-12-26 ENCOUNTER — MYC MEDICAL ADVICE (OUTPATIENT)
Dept: INTERNAL MEDICINE | Facility: CLINIC | Age: 34
End: 2024-12-26
Payer: COMMERCIAL

## 2025-01-06 ENCOUNTER — LAB (OUTPATIENT)
Dept: LAB | Facility: CLINIC | Age: 35
End: 2025-01-06
Attending: INTERNAL MEDICINE
Payer: COMMERCIAL

## 2025-01-06 ENCOUNTER — HOSPITAL ENCOUNTER (OUTPATIENT)
Dept: ULTRASOUND IMAGING | Facility: CLINIC | Age: 35
Discharge: HOME | End: 2025-01-06
Attending: INTERNAL MEDICINE
Payer: COMMERCIAL

## 2025-01-06 DIAGNOSIS — E78.00 ELEVATED LDL CHOLESTEROL LEVEL: ICD-10-CM

## 2025-01-06 DIAGNOSIS — E04.9 GOITER: ICD-10-CM

## 2025-01-06 DIAGNOSIS — R73.03 PRE-DIABETES: ICD-10-CM

## 2025-01-06 LAB
CHOLEST SERPL-MCNC: 199 MG/DL
EST. AVERAGE GLUCOSE BLD GHB EST-MCNC: 123 MG/DL
FASTING STATUS PATIENT QL REPORTED: YES
HBA1C MFR BLD: 5.9 %
HDLC SERPL-MCNC: 71 MG/DL
LDLC SERPL CALC-MCNC: 107 MG/DL
NONHDLC SERPL-MCNC: 128 MG/DL
TRIGL SERPL-MCNC: 104 MG/DL
TSH SERPL DL<=0.005 MIU/L-ACNC: 1.61 UIU/ML (ref 0.3–4.2)

## 2025-01-06 PROCEDURE — 80061 LIPID PANEL: CPT

## 2025-01-06 PROCEDURE — 83036 HEMOGLOBIN GLYCOSYLATED A1C: CPT

## 2025-01-06 PROCEDURE — 84443 ASSAY THYROID STIM HORMONE: CPT

## 2025-01-06 PROCEDURE — 36415 COLL VENOUS BLD VENIPUNCTURE: CPT

## 2025-01-06 PROCEDURE — 76536 US EXAM OF HEAD AND NECK: CPT

## 2025-02-11 ENCOUNTER — PATIENT OUTREACH (OUTPATIENT)
Dept: CARE COORDINATION | Facility: CLINIC | Age: 35
End: 2025-02-11
Payer: COMMERCIAL

## 2025-02-19 ENCOUNTER — HOSPITAL ENCOUNTER (OUTPATIENT)
Dept: RADIOLOGY | Facility: CLINIC | Age: 35
Discharge: HOME | End: 2025-02-19
Attending: INTERNAL MEDICINE
Payer: COMMERCIAL

## 2025-02-19 DIAGNOSIS — S69.91XA HAND INJURY, RIGHT, INITIAL ENCOUNTER: ICD-10-CM

## 2025-02-19 PROCEDURE — 73140 X-RAY EXAM OF FINGER(S): CPT | Mod: RT

## 2025-02-25 ENCOUNTER — PATIENT OUTREACH (OUTPATIENT)
Dept: CARE COORDINATION | Facility: CLINIC | Age: 35
End: 2025-02-25
Payer: COMMERCIAL

## 2025-05-17 ENCOUNTER — RESULTS FOLLOW-UP (OUTPATIENT)
Dept: INTERNAL MEDICINE | Facility: CLINIC | Age: 35
End: 2025-05-17